# Patient Record
Sex: MALE | Race: WHITE | URBAN - METROPOLITAN AREA
[De-identification: names, ages, dates, MRNs, and addresses within clinical notes are randomized per-mention and may not be internally consistent; named-entity substitution may affect disease eponyms.]

---

## 2017-03-11 ENCOUNTER — OFFICE VISIT (OUTPATIENT)
Dept: URGENT CARE | Facility: URGENT CARE | Age: 22
End: 2017-03-11
Payer: COMMERCIAL

## 2017-03-11 VITALS
TEMPERATURE: 97.9 F | HEIGHT: 75 IN | DIASTOLIC BLOOD PRESSURE: 84 MMHG | OXYGEN SATURATION: 100 % | HEART RATE: 84 BPM | WEIGHT: 220 LBS | SYSTOLIC BLOOD PRESSURE: 132 MMHG | BODY MASS INDEX: 27.35 KG/M2

## 2017-03-11 DIAGNOSIS — R09.81 NASAL SINUS CONGESTION: ICD-10-CM

## 2017-03-11 DIAGNOSIS — H66.002 ACUTE SUPPURATIVE OTITIS MEDIA OF LEFT EAR WITHOUT SPONTANEOUS RUPTURE OF TYMPANIC MEMBRANE, RECURRENCE NOT SPECIFIED: Primary | ICD-10-CM

## 2017-03-11 DIAGNOSIS — R07.0 THROAT PAIN: ICD-10-CM

## 2017-03-11 DIAGNOSIS — J00 ACUTE NASOPHARYNGITIS: ICD-10-CM

## 2017-03-11 LAB
DEPRECATED S PYO AG THROAT QL EIA: NORMAL
MICRO REPORT STATUS: NORMAL
SPECIMEN SOURCE: NORMAL

## 2017-03-11 PROCEDURE — 87880 STREP A ASSAY W/OPTIC: CPT | Performed by: INTERNAL MEDICINE

## 2017-03-11 PROCEDURE — 99203 OFFICE O/P NEW LOW 30 MIN: CPT | Performed by: FAMILY MEDICINE

## 2017-03-11 PROCEDURE — 87081 CULTURE SCREEN ONLY: CPT | Performed by: INTERNAL MEDICINE

## 2017-03-11 RX ORDER — FLUTICASONE PROPIONATE 50 MCG
1-2 SPRAY, SUSPENSION (ML) NASAL DAILY
Qty: 1 BOTTLE | Refills: 11 | Status: SHIPPED | OUTPATIENT
Start: 2017-03-11 | End: 2019-04-24

## 2017-03-11 RX ORDER — LEVETIRACETAM 100 MG/ML
10 SOLUTION ORAL 2 TIMES DAILY
COMMUNITY
End: 2018-10-15

## 2017-03-11 RX ORDER — AMOXICILLIN 500 MG/1
500 CAPSULE ORAL 3 TIMES DAILY
Qty: 30 CAPSULE | Refills: 0 | Status: SHIPPED | OUTPATIENT
Start: 2017-03-11 | End: 2017-03-21

## 2017-03-11 NOTE — NURSING NOTE
"Chief Complaint   Patient presents with     Urgent Care     Sinus Problem     c/o sinus infection,HA and sore throat for 5 days       Initial /84 (BP Location: Left arm, Patient Position: Chair, Cuff Size: Adult Large)  Pulse 84  Temp 97.9  F (36.6  C) (Tympanic)  Ht 6' 3\" (1.905 m)  Wt 220 lb (99.8 kg)  SpO2 100%  BMI 27.5 kg/m2 Estimated body mass index is 27.5 kg/(m^2) as calculated from the following:    Height as of this encounter: 6' 3\" (1.905 m).    Weight as of this encounter: 220 lb (99.8 kg).  Medication Reconciliation: complete   Sruthi Simpson MA    "

## 2017-03-11 NOTE — MR AVS SNAPSHOT
"              After Visit Summary   3/11/2017    Crow Carnes    MRN: [id number]           Patient Information     Date Of Birth          1995        Visit Information        Provider Department      3/11/2017 8:30 AM Evie Lackey DO [facility]        Today's Diagnoses     Acute suppurative otitis media of left ear without spontaneous rupture of tympanic membrane, recurrence not specified    -  1    Throat pain        Acute nasopharyngitis        Nasal sinus congestion          Care Instructions            Follow-ups after your visit        Who to contact     If you have questions or need follow up information about today's clinic visit or your schedule please contact [facility] directly at [phone].  Normal or non-critical lab and imaging results will be communicated to you by [employer], letter or phone within 4 business days after the clinic has received the results. If you do not hear from us within 7 days, please contact the clinic through MyChart or phone. If you have a critical or abnormal lab result, we will notify you by phone as soon as possible.  Submit refill requests through [employer] or call your pharmacy and they will forward the refill request to us. Please allow 3 business days for your refill to be completed.          Additional Information About Your Visit        MyChart Information     [employer] lets you send messages to your doctor, view your test results, renew your prescriptions, schedule appointments and more. To sign up, go to [email or web address] . Click on \"Log in\" on the left side of the screen, which will take you to the Welcome page. Then click on \"Sign up Now\" on the right side of the page.     You will be asked to enter the access code listed below, as well as some personal information. Please follow the directions to create your username and password.     Your access code is: [email or web address]  Expires: 6/9/2017  9:14 AM   " "  Your access code will  in 90 days. If you need help or a new code, please call your [facility] clinic or [phone].        Care EveryWhere ID     This is your Care EveryWhere ID. This could be used by other organizations to access your [facility] medical records  [id number]        Your Vitals Were     Pulse Temperature Height Pulse Oximetry BMI (Body Mass Index)       84 97.9  F (36.6  C) (Tympanic) 6' 3\" (1.905 m) 100% 27.5 kg/m2        Blood Pressure from Last 3 Encounters:   17 132/84    Weight from Last 3 Encounters:   17 220 lb (99.8 kg)              We Performed the Following     Beta strep group A culture     Strep, Rapid Screen          Today's Medication Changes          These changes are accurate as of: 3/11/17  9:15 AM.  If you have any questions, ask your nurse or doctor.               Start taking these medicines.        Dose/Directions    amoxicillin 500 MG capsule   Commonly known as:  AMOXIL   Used for:  Acute suppurative otitis media of left ear without spontaneous rupture of tympanic membrane, recurrence not specified   Started by:  Evie Lackey DO        Dose:  500 mg   Take 1 capsule (500 mg) by mouth 3 times daily for 10 days   Quantity:  30 capsule   Refills:  0       fluticasone 50 MCG/ACT spray   Commonly known as:  FLONASE   Used for:  Nasal sinus congestion   Started by:  Evie Lackey DO        Dose:  1-2 spray   Spray 1-2 sprays into both nostrils daily   Quantity:  1 Bottle   Refills:  11            Where to get your medicines      These medications were sent to [facility] - [place], [place] - [address], [place] [address]     Phone:  [phone]     amoxicillin 500 MG capsule    fluticasone 50 MCG/ACT spray                Primary Care Provider    None Specified       No primary provider on file.        Thank you!     Thank you for choosing [facility]  for your care. Our goal is always " to provide you with excellent care. Hearing back from our patients is one way we can continue to improve our services. Please take a few minutes to complete the written survey that you may receive in the mail after your visit with us. Thank you!             Your Updated Medication List - Protect others around you: Learn how to safely use, store and throw away your medicines at www.disposemymeds.org.          This list is accurate as of: 3/11/17  9:15 AM.  Always use your most recent med list.                   Brand Name Dispense Instructions for use    amoxicillin 500 MG capsule    AMOXIL    30 capsule    Take 1 capsule (500 mg) by mouth 3 times daily for 10 days       fluticasone 50 MCG/ACT spray    FLONASE    1 Bottle    Spray 1-2 sprays into both nostrils daily       LAMOTRIGINE PO          levETIRAcetam 100 MG/ML solution    KEPPRA     Take 10 mg/kg by mouth 2 times daily       TOPIRAMATE PO

## 2017-03-11 NOTE — PROGRESS NOTES
"SUBJECTIVE:   Crow Carnes is a 21 year old male presenting with a chief complaint of Upper Respiratory/ENT symptoms:  Symptoms started 3/6 and  include: nasal and sinus congestion, rhinorrhea, \"cold symptoms\", cough , ear pressure/pain, sore throat and headache  Course of illness is: same.    Treatment measures tried:  OTC meds.  Predisposing factors include:  Non smoker.  h/o exercise induced asthma as a child, no problems since middle school     ROS:  5-Point Review of Systems Negative-- Except as stated above.    OBJECTIVE  /84 (BP Location: Left arm, Patient Position: Chair, Cuff Size: Adult Large)  Pulse 84  Temp 97.9  F (36.6  C) (Tympanic)  Ht 6' 3\" (1.905 m)  Wt 220 lb (99.8 kg)  SpO2 100%  BMI 27.5 kg/m2  GENERAL:  Awake, alert and interactive. No acute distress.  HEENT:   NC/AT, EOMI, clear conjunctiva.  Nose congested.  Oropharynx with mild erythema, moist and clear.  TM right not visible due to wax impaction, TM left brightly erythematous, excess wax in canal.  NECK: supple and free of adenopathy  CHEST:  Lungs are clear, no rhonchi, wheezing or rales. Normal symmetric air entry throughout both lung fields.   HEART:  S1 and S2 normal, no murmurs, clicks, gallops or rubs. Regular rate and rhythm.    Results for orders placed or performed in visit on 03/11/17   Strep, Rapid Screen   Result Value Ref Range    Specimen Description Throat     Rapid Strep A Screen       NEGATIVE: No Group A streptococcal antigen detected by immunoassay, await   culture report.      Micro Report Status FINAL 03/11/2017        ASSESSMENT/PLAN    ICD-10-CM    1. Acute suppurative otitis media of left ear without spontaneous rupture of tympanic membrane, recurrence not specified H66.002 amoxicillin (AMOXIL) 500 MG capsule   2. Throat pain R07.0 Strep, Rapid Screen     Beta strep group A culture   3. Acute nasopharyngitis J00    4. Nasal sinus congestion R09.81 fluticasone (FLONASE) 50 MCG/ACT spray    We discussed the " expected course, medications and symptomatic cares in detail, including return to care if symptoms not improving as expected, do not resolve completely, or if any new or worsening symptoms develop.

## 2017-03-13 LAB
BACTERIA SPEC CULT: NORMAL
MICRO REPORT STATUS: NORMAL
SPECIMEN SOURCE: NORMAL

## 2017-05-26 ENCOUNTER — TRANSFERRED RECORDS (OUTPATIENT)
Dept: HEALTH INFORMATION MANAGEMENT | Facility: CLINIC | Age: 22
End: 2017-05-26

## 2018-04-30 ENCOUNTER — TELEPHONE (OUTPATIENT)
Dept: OPHTHALMOLOGY | Facility: CLINIC | Age: 23
End: 2018-04-30

## 2018-10-12 ENCOUNTER — PRE VISIT (OUTPATIENT)
Dept: NEUROLOGY | Facility: CLINIC | Age: 23
End: 2018-10-12

## 2018-10-12 NOTE — TELEPHONE ENCOUNTER
FUTURE VISIT INFORMATION      FUTURE VISIT INFORMATION:    Date: 10/15/18    Time: 1p    Location: [facility]  REFERRAL INFORMATION:    Referring provider:  Dr. Carla Martinez    Referring providers clinic:  [facility]    Reason for visit/diagnosis  Unknown    RECORDS REQUESTED FROM:       Clinic name Comments Records Status Imaging Status   [facility]  Requested Requested                                   RECORDS STATUS      10/15/18: No known reason for visit. Called referring provider's office and left a voice mail to fax over records/imaging.

## 2018-10-15 ENCOUNTER — OFFICE VISIT (OUTPATIENT)
Dept: NEUROLOGY | Facility: CLINIC | Age: 23
End: 2018-10-15
Payer: COMMERCIAL

## 2018-10-15 ENCOUNTER — APPOINTMENT (OUTPATIENT)
Dept: LAB | Facility: CLINIC | Age: 23
End: 2018-10-15
Payer: COMMERCIAL

## 2018-10-15 VITALS
SYSTOLIC BLOOD PRESSURE: 117 MMHG | BODY MASS INDEX: 24 KG/M2 | HEART RATE: 90 BPM | HEIGHT: 72 IN | DIASTOLIC BLOOD PRESSURE: 65 MMHG | TEMPERATURE: 97.9 F | OXYGEN SATURATION: 100 % | WEIGHT: 177.2 LBS

## 2018-10-15 DIAGNOSIS — G40.919 INTRACTABLE EPILEPSY WITHOUT STATUS EPILEPTICUS, UNSPECIFIED EPILEPSY TYPE (H): ICD-10-CM

## 2018-10-15 DIAGNOSIS — G44.89 OTHER HEADACHE SYNDROME: ICD-10-CM

## 2018-10-15 DIAGNOSIS — R56.9 SEIZURE-LIKE ACTIVITY (H): Primary | ICD-10-CM

## 2018-10-15 LAB
ALBUMIN SERPL-MCNC: 4.6 G/DL (ref 3.4–5)
ALP SERPL-CCNC: 56 U/L (ref 40–150)
ALT SERPL W P-5'-P-CCNC: 24 U/L (ref 0–70)
ANION GAP SERPL CALCULATED.3IONS-SCNC: 5 MMOL/L (ref 3–14)
AST SERPL W P-5'-P-CCNC: 14 U/L (ref 0–45)
BASOPHILS # BLD AUTO: 0 10E9/L (ref 0–0.2)
BASOPHILS NFR BLD AUTO: 0.8 %
BILIRUB SERPL-MCNC: 0.5 MG/DL (ref 0.2–1.3)
BUN SERPL-MCNC: 20 MG/DL (ref 7–30)
CALCIUM SERPL-MCNC: 9.3 MG/DL (ref 8.5–10.1)
CHLORIDE SERPL-SCNC: 106 MMOL/L (ref 94–109)
CO2 SERPL-SCNC: 28 MMOL/L (ref 20–32)
CREAT SERPL-MCNC: 1.36 MG/DL (ref 0.66–1.25)
DIFFERENTIAL METHOD BLD: NORMAL
EOSINOPHIL # BLD AUTO: 0.2 10E9/L (ref 0–0.7)
EOSINOPHIL NFR BLD AUTO: 3.2 %
ERYTHROCYTE [DISTWIDTH] IN BLOOD BY AUTOMATED COUNT: 11.8 % (ref 10–15)
GFR SERPL CREATININE-BSD FRML MDRD: 64 ML/MIN/1.7M2
GLUCOSE SERPL-MCNC: 91 MG/DL (ref 70–99)
HCT VFR BLD AUTO: 52.3 % (ref 40–53)
HGB BLD-MCNC: 17.5 G/DL (ref 13.3–17.7)
IMM GRANULOCYTES # BLD: 0 10E9/L (ref 0–0.4)
IMM GRANULOCYTES NFR BLD: 0.4 %
LYMPHOCYTES # BLD AUTO: 1.7 10E9/L (ref 0.8–5.3)
LYMPHOCYTES NFR BLD AUTO: 34.3 %
MCH RBC QN AUTO: 29.7 PG (ref 26.5–33)
MCHC RBC AUTO-ENTMCNC: 33.5 G/DL (ref 31.5–36.5)
MCV RBC AUTO: 89 FL (ref 78–100)
MONOCYTES # BLD AUTO: 0.5 10E9/L (ref 0–1.3)
MONOCYTES NFR BLD AUTO: 9.6 %
NEUTROPHILS # BLD AUTO: 2.6 10E9/L (ref 1.6–8.3)
NEUTROPHILS NFR BLD AUTO: 51.7 %
NRBC # BLD AUTO: 0 10*3/UL
NRBC BLD AUTO-RTO: 0 /100
PLATELET # BLD AUTO: 253 10E9/L (ref 150–450)
POTASSIUM SERPL-SCNC: 4.5 MMOL/L (ref 3.4–5.3)
PROT SERPL-MCNC: 7.8 G/DL (ref 6.8–8.8)
RBC # BLD AUTO: 5.9 10E12/L (ref 4.4–5.9)
SODIUM SERPL-SCNC: 138 MMOL/L (ref 133–144)
WBC # BLD AUTO: 5 10E9/L (ref 4–11)

## 2018-10-15 RX ORDER — AMITRIPTYLINE HYDROCHLORIDE 10 MG/1
10 TABLET ORAL AT BEDTIME
Refills: 4 | COMMUNITY
Start: 2017-11-01 | End: 2018-10-15

## 2018-10-15 RX ORDER — AMITRIPTYLINE HYDROCHLORIDE 10 MG/1
TABLET ORAL
Qty: 60 TABLET | Refills: 6 | Status: SHIPPED | OUTPATIENT
Start: 2018-10-15 | End: 2019-01-28

## 2018-10-15 RX ORDER — TOPIRAMATE 25 MG/1
TABLET, FILM COATED ORAL
Qty: 120 TABLET | Refills: 6 | Status: SHIPPED | OUTPATIENT
Start: 2018-10-15 | End: 2018-10-17

## 2018-10-15 RX ORDER — LAMOTRIGINE 100 MG/1
TABLET ORAL
Qty: 150 TABLET | Refills: 6 | Status: SHIPPED | OUTPATIENT
Start: 2018-10-15 | End: 2019-01-28

## 2018-10-15 ASSESSMENT — ENCOUNTER SYMPTOMS
ALTERED TEMPERATURE REGULATION: 0
INSOMNIA: 1
DISTURBANCES IN COORDINATION: 0
POLYPHAGIA: 0
FEVER: 0
TREMORS: 0
SEIZURES: 1
DEPRESSION: 1
NUMBNESS: 0
WEAKNESS: 0
WEIGHT LOSS: 0
DIZZINESS: 0
DOUBLE VISION: 0
DECREASED CONCENTRATION: 1
NIGHT SWEATS: 0
HALLUCINATIONS: 0
WEIGHT GAIN: 0
FATIGUE: 1
CHILLS: 0
DECREASED APPETITE: 0
EYE IRRITATION: 0
EYE REDNESS: 0
EYE WATERING: 0
MEMORY LOSS: 0
PANIC: 0
INCREASED ENERGY: 1
POLYDIPSIA: 0
EYE PAIN: 1
SPEECH CHANGE: 0
HEADACHES: 1
NERVOUS/ANXIOUS: 0
TINGLING: 0
LOSS OF CONSCIOUSNESS: 0
PARALYSIS: 0

## 2018-10-15 ASSESSMENT — PAIN SCALES - GENERAL: PAINLEVEL: NO PAIN (0)

## 2018-10-15 NOTE — NURSING NOTE
Chief Complaint   Patient presents with     New Patient     [facility] NEW PATIENT CONSULTATION VISIT RELATED TO SEIZURES        Betty Grossman MA

## 2018-10-15 NOTE — LETTER
10/15/2018       RE: Crow Carnes  [address] [facility] [address]     Dear Colleague,    Thank you for referring your patient, Crow Carnes, to the [facility]UR[facility] at [facility]. Please see a copy of my visit note below.    Service Date: 10/15/2018      Carla Martinez MD   [facility],     [address] [place] [address]      RE: Crow Carnes   MRN: [id number]   : 1995      Dear Dr. Martinez:       Thank you for referring Mr. Crow Carnes to [facility] for transfer of care.  He recently moved to [place] and would like a center closer to his home.  He is a 22-year-old, right-handed male who states that he first started having seizures as an infant.  His mom has told him that he had a traumatic delivery and subsequently had seizures.  The patient denies having intracranial hemorrhage in infancy, but is not aware of the full details and states that he had seizures from infancy to 1 years of age and took a medication and does not know the name of the medication.  The seizures subsided from age 1 up until the age of 16-15, during which time he was medication free.  At the age of 16-17, he began to have recurrent spells, which are described below in detail, but these spells consist of dizziness and an internal sensation of spinning.  No loss of awareness.  These happen 10-20 times per month.  In the past, he has been diagnosed with seizures and put on lamotrigine and topiramate, which he thinks have been helpful.  In the last 4 months, he feels these spells have worsened because of his job hours.  He is working a swing schedule, which means that he works various shifts.  Sometimes he may work from 6:00 a.m. to 2:00 p.m., and other times he may work from 6:00 p.m. to 2:00 a.m.  The patient denies ever having status epilepticus.  No convulsions and no partial seizures with loss of awareness.  Currently, on seizure medications, the  patient has insomnia, difficulty with word retrieval and cognitive deficits.      Spell type 1:  The patient has a dizzy sensation.  He feels like he has an internal spinning sensation.  No loss of awareness.  These happen 10-20 times a month, lasting 10-20 seconds.  They irritate him, but they do not cause trauma.  He may have increased headache after these spells.  When his mom has witnessed them, she says that he is not paying attention.  No history of complex partial seizures, no generalized tonic-clonic seizures, no loss of awareness and no loss of motor control.      RISK FACTORS FOR EPILEPSY:  The patient had seizures as an infant and had a traumatic delivery; he does not know the details.  He took seizure medications from infancy to 1 years of age.  He states that there is no family history of epilepsy.  No history of encephalitis, meningitis or stroke.  He had no major developmental delays in walking or language.  He had an IEP early on, but after middle school, he no longer needed an IEP and did take advanced classes and completed higher education.        PAST MEDICAL HISTORY:  Migraines, depression and epilepsy.      PAST SURGICAL HISTORY:  None.      PAST SEIZURE MEDICATIONS:  Levetiracetam was not helpful, he states, and he is not aware of all his seizure meds.      ALLERGIES:  None.      SOCIAL HISTORY:  The patient lives with a roommate.  He works as a  in the lab.  He does not work with machines, but he does work with chemicals at [employer].  He has completed a bachelor's degree in chemistry.  He is at a new job and is just adjusting to their schedule.  Their hours are variable.  He has swing hours and may work 6:00 a.m. to 2:00 p.m., and other times he may work 6:00 p.m. to 2:00 a.m., so he does forget to take his medications sometimes.  The patient drinks 4 drinks on the weekend.  No smoking, no drugs.      PSYCHOLOGICAL HISTORY:  The patient denies feeling depressed currently.   No suicidal ideations.  He did have an episode of severe depression a year ago, during which time he lost 70 pounds.      EXAMINATION /65  Pulse 90  Temp 97.9  F (36.6  C) (Oral)  Ht 6' (182.9 cm)  Wt 177 lb 3.2 oz (80.4 kg)  SpO2 100%  BMI 24.03 kg/m2  GENERAL:  Alert and oriented x3.  Cognitively, the patient is alert and oriented; however, he is slow to respond.  Has a flat affect.     CARDIOVASCULAR:  Regular rate and rhythm, positive S1, S2.   LUNGS:  Clear to auscultation bilaterally.   ABDOMEN:  Nondistended, nontender.  Normal active bowel sounds.      NEUROLOGICAL EXAMINATION   Mental Status and Higher Cortical Functions:  Alert and oriented to person, place, and time.  Speech fluent, with intact naming and repetition. No dysarthria.  Cranial Nerves (II-XII):  Pupils equal, round, and reactive to light.  Extraocular movements full with no nystagmus.  Visual fields full to confrontation.  Facial sensation intact to light touch, temperature, and pin prick.  Face symmetric at rest and with activation.  Hearing intact to finger rub bilaterally.  Tongue midline and palate elevation symmetric.  Sternocleidomastoid and trapezius 5/5 bilaterally.   Fundoscopic examination was unremarkable for pallor or edema bilaterally.    Motor:  Normal tone, normal bulk, and no pronator drift.  No tremors or fasciculations. Arm/hand circumduction was symmetric.  Motor strength 5/5 in upper and lower extremities.   Sensation:  Intact to light touch, vibration, and temperature.    Coordination:  Normal finger-nose-finger, fine finger movements, and rapid alternating movements.  No ataxia or dysmetria.     Reflexes:  Deep tendon reflexes 2+ and symmetric throughout.    Gait:  Casual gait and stance normal.         IMPRESSION:  A 22-year-old, right-handed male presents with recurrent paroxysmal spells consisting of dizziness and an internal spinning sensation, which in the past have been identified to be seizures on EEG  data.  The patient states his MRI of the brain is normal.  We do not have records from [facility], and we will have to obtain them.  He had an EEG at [facility] on 10/15/2018, which was notable for bursts of intermittent generalized delta-theta slowing consistent with a mild encephalopathy.  Note the patient had one aura in which he felt dizziness, and there were no associated EEG changes.  The EEG was consistent with a mild encephalopathy, and certainly simple partial seizures may be scalp EEG negative.      DISCUSSION:  The patient is currently on topiramate and lamotrigine and has side effects of insomnia and cognitive deficits.  It would be helpful to shift the majority of his lamotrigine into the morning time, 300 mg in the morning and 200 mg at 6:00 a.m., because the patient usually goes to bed at 10:00 p.m.  Additionally, I would like to lower topiramate dose as the patient works as a , and he does have word-retrieval difficulties and cognitive deficits.  We will increase his amitriptyline in an effort to better control headaches.  Consideration may be given to Depakote in the future.  I did ask the patient today if he wanted to do video EEG monitoring for us to characterize his spells better and to make sure he does not have partial seizures with loss of awareness; he declined, as this would not be possible for him.  In the future, we may give consideration to ambulatory EEG.      PLAN:   1.  Reduce topiramate and change administration time of lamotrigine.  Increase amitriptyline.                    Medication Name   Tablet Size         8 AM  (morning)   6 PM (Night)   Notes    Week 1 Lamotrigine 100 mg   3 tablet   2 tablet   Take lamotrigine after food     Topiramate 50 mg   2 tablet    2 tablet       Amitriptyline 10 mg      1 tablet                       Medication Name   Tablet Size         8 AM  (morning)   6 PM (Night)   Notes    Week 2 Lamotrigine 100 mg   3 tablet   2 tablet        Topiramate 50 mg   2 tablet    2 tablet       Amitriptyline 10 mg      2 tablet  Increase dose                     Medication Name   Tablet Size         8 AM  (morning)   6 PM (Night)   Notes    Week 3 Lamotrigine 100 mg   3 tablet   2 tablet       Topiramate 50 mg   1 tablet    2 tablet  Decrease dose     Amitriptyline 10 mg      2 tablet                  Medication Name   Tablet Size         8 AM  (morning)   6 PM (Night)   Notes    Week 4 Lamotrigine 100 mg   3 tablet   2 tablet       Topiramate 50 mg   1 tablet    1 tablet  Decrease dose     Amitriptyline 10 mg      2 tablet         If your seizure gets worse or headaches increase, then go back to the prior week or call [facility]      2.  I typed a letter out for work to reduce his swing hours in an effort to stabilize his schedule and sleep schedule.  This may hopefully reduce his seizures.   3.  Check AED levels for efficacy, toxicity and side effects.   4.  Consider ambulatory EEG in the future to characterize interictal/ictal burden, as the patient declined inpatient hospitalization.     5.  Future medications that may be considered are Trileptal, Vimpat, Zonegran and gabapentin, also.  Follow up in 6 weeks.   6.  Nurse call in 3 weeks to check on AED medication changes.      Thank you for allowing us to participate in Mr. Carnes' care.      Sincerely,      Carmel Mcgregor MD         I spent 60 minutes with the patient. During this time counseling and coordination of care exceeded 50% of the face to face visit time. I addressed all questions the patient/caregiver raised in regards to the patient's medical care.

## 2018-10-15 NOTE — PATIENT INSTRUCTIONS
Medication Name   Tablet Size         8 AM  (morning)   6 PM (Night)   Notes    Week 1 Lamotrigine 100 mg   3 tablet   2 tablet   Take lamotrigine after food     Topiramate 50 mg   2 tablet    2 tablet       Amitriptyline 10 mg      1 tablet                       Medication Name   Tablet Size         8 AM  (morning)   6 PM (Night)   Notes    Week 2 Lamotrigine 100 mg   3 tablet   2 tablet       Topiramate 50 mg   2 tablet    2 tablet       Amitriptyline 10 mg      2 tablet  Increase dose                     Medication Name   Tablet Size         8 AM  (morning)   6 PM (Night)   Notes    Week 3 Lamotrigine 100 mg   3 tablet   2 tablet       Topiramate 50 mg   1 tablet    2 tablet  Decrease dose     Amitriptyline 10 mg      2 tablet                  Medication Name   Tablet Size         8 AM  (morning)   6 PM (Night)   Notes    Week 4 Lamotrigine 100 mg   3 tablet   2 tablet       Topiramate 50 mg   1 tablet    1 tablet  Decrease dose     Amitriptyline 10 mg      2 tablet         If your seizure gets worse or headaches increase, then go back to the prior week or [facility]    CONTINUE TAKING YOUR OTHER MEDICATIONS AS PREVIOUSLY DIRECTED.  IF YOU  HAVE ANY SIDE EFFECTS OR CONCERNS ABOUT YOUR ANTIEPILEPTIC DRUG CALL [facility] OFF[facility] -[phone]. PLEASE FOLLOW MEDICATION CHANGES AS ADVISED.     This titration schedule was revive wed with patient or caregiver. They expressed understanding of these antiepileptic drug changes.     MICKY SALAZAR MD

## 2018-10-15 NOTE — LETTER
10/15/2018         RE: Crow Carnes  : 1995   MRN: [id number]      To Whom It May Concern,       I recommend that Mr. Carnes work regular scheduled hours, to accomodate his medical condition. Swing shifts exacerbate his medical condition and have an adverse effect on his health. Thank you for your cooperation.       Sincerely,       Carmel Mcgregor MD

## 2018-10-15 NOTE — LETTER
Patient:  Crow Carnes  :   1995  MRN:     [id number]        Mr.Casey Carnes  [address] [facility] [address]        November 15, 2018    Dear ,    We are writing to inform you of your test results.    Your test results fall within the expected range(s) or remain unchanged from previous results.  Please continue with current treatment plan.    Resulted Orders   Lamotrigine Level   Result Value Ref Range    Lamotrigine Level 7.6 2.5 - 15.0 ug/mL      Comment:      (Note)  INTERPRETIVE INFORMATION:  Lamotrigine  Therapeutic Range:  2.5-15.0 ug/mL             Toxic:  Not well established  Pharmacokinetics varies widely, particularly with   co-medications and/or compromised renal function.  Adverse   effects may include dizziness, somnolence, nausea and   vomiting.  Performed by [facility],  [address] [facility] [address] [phone]  www.[email or web address], [place] MD, Lab. Director     Topiramate Level   Result Value Ref Range    Topiramate Level 3.3 (L) 5.0 - 20.0 ug/mL      Comment:      (Note)  INTERPRETIVE INFORMATION: Topiramate  Therapeutic range:  5.0-20.0 ug/mL             Toxic:  Not well established  Pharmacokinetics varies widely, particularly with   co-medications, age, and/or compromised renal function.   Adverse effects may include somnolence, fatigue, and   dizziness.  Performed by [facility],  [address] [facility] [id number] [phone]  www.[facility], [place] MD, Lab. Director     Comprehensive metabolic panel   Result Value Ref Range    Sodium 138 133 - 144 mmol/L    Potassium 4.5 3.4 - 5.3 mmol/L    Chloride 106 94 - 109 mmol/L    Carbon Dioxide 28 20 - 32 mmol/L    Anion Gap 5 3 - 14 mmol/L    Glucose 91 70 - 99 mg/dL    Urea Nitrogen 20 7 - 30 mg/dL    Creatinine 1.36 (H) 0.66 - 1.25 mg/dL    GFR Estimate 64 >60 mL/min/1.7m2      Comment:      Non  GFR Calc    GFR Estimate If Black 77 >60 mL/min/1.7m2      Comment:        GFR Calc    Calcium 9.3 8.5 - 10.1 mg/dL    Bilirubin Total 0.5 0.2 - 1.3 mg/dL    Albumin 4.6 3.4 - 5.0 g/dL    Protein Total 7.8 6.8 - 8.8 g/dL    Alkaline Phosphatase 56 40 - 150 U/L    ALT 24 0 - 70 U/L    AST 14 0 - 45 U/L   CBC with platelets differential   Result Value Ref Range    WBC 5.0 4.0 - 11.0 10e9/L    RBC Count 5.90 4.4 - 5.9 10e12/L    Hemoglobin 17.5 13.3 - 17.7 g/dL    Hematocrit 52.3 40.0 - 53.0 %    MCV 89 78 - 100 fl    MCH 29.7 26.5 - 33.0 pg    MCHC 33.5 31.5 - 36.5 g/dL    RDW 11.8 10.0 - 15.0 %    Platelet Count 253 150 - 450 10e9/L    Diff Method Automated Method     % Neutrophils 51.7 %    % Lymphocytes 34.3 %    % Monocytes 9.6 %    % Eosinophils 3.2 %    % Basophils 0.8 %    % Immature Granulocytes 0.4 %    Nucleated RBCs 0 0 /100    Absolute Neutrophil 2.6 1.6 - 8.3 10e9/L    Absolute Lymphocytes 1.7 0.8 - 5.3 10e9/L    Absolute Monocytes 0.5 0.0 - 1.3 10e9/L    Absolute Eosinophils 0.2 0.0 - 0.7 10e9/L    Absolute Basophils 0.0 0.0 - 0.2 10e9/L    Abs Immature Granulocytes 0.0 0 - 0.4 10e9/L    Absolute Nucleated RBC 0.0          Happy HolarielsCarmel MD              [phone]  1995

## 2018-10-15 NOTE — MR AVS SNAPSHOT
After Visit Summary   10/15/2018    Crow Carnes    MRN: [id number]           Patient Information     Date Of Birth          1995        Visit Information        Provider Department      10/15/2018 1:00 PM Carmel Salazar MD [facility] Neurology        Today's Diagnoses     Intractable epilepsy without status epilepticus, unspecified epilepsy type (H)        Other headache syndrome          Care Instructions                     Medication Name   Tablet Size         8 AM  (morning)   6 PM (Night)   Notes    Week 1 Lamotrigine 100 mg   3 tablet   2 tablet   Take lamotrigine after food     Topiramate 50 mg   2 tablet    2 tablet       Amitriptyline 10 mg      1 tablet                       Medication Name   Tablet Size         8 AM  (morning)   6 PM (Night)   Notes    Week 2 Lamotrigine 100 mg   3 tablet   2 tablet       Topiramate 50 mg   2 tablet    2 tablet       Amitriptyline 10 mg      2 tablet  Increase dose                     Medication Name   Tablet Size         8 AM  (morning)   6 PM (Night)   Notes    Week 3 Lamotrigine 100 mg   3 tablet   2 tablet       Topiramate 50 mg   1 tablet    2 tablet  Decrease dose     Amitriptyline 10 mg      2 tablet                  Medication Name   Tablet Size         8 AM  (morning)   6 PM (Night)   Notes    Week 4 Lamotrigine 100 mg   3 tablet   2 tablet       Topiramate 50 mg   1 tablet    1 tablet  Decrease dose     Amitriptyline 10 mg      2 tablet         If your seizure gets worse or headaches increase, then go back to the prior week or [facility]    CONTINUE TAKING YOUR OTHER MEDICATIONS AS PREVIOUSLY DIRECTED.  IF YOU  HAVE ANY SIDE EFFECTS OR CONCERNS ABOUT YOUR ANTIEPILEPTIC DRUG CALL [facility] [phone]. PLEASE FOLLOW MEDICATION CHANGES AS ADVISED.     This titration schedule was revive wed with patient or caregiver. They expressed understanding of these antiepileptic drug changes.     CARMEL SALAZAR MD               Follow-ups after  your visit        Follow-up notes from your care team     Return in about 6 weeks (around 2018).      Your next 10 appointments already scheduled     Dec 10, 2018  2:00 PM CST   (Arrive by 1:45 PM)   Return Seizure with Carmel Mcgregor MD   [facility] ([facility] [place])    [address] [address] [phone]              Who to contact     Please call your clinic at [phone] to:    Ask questions about your health    Make or cancel appointments    Discuss your medicines    Learn about your test results    Speak to your doctor            Additional Information About Your Visit        MyChart Information     [employer] is an electronic gateway that provides easy, online access to your medical records. With [employer], you can request a clinic appointment, read your test results, renew a prescription or communicate with your care team.     To sign up for [employer] visit the website at [email or web address]   You will be asked to enter the access code listed below, as well as some personal information. Please follow the directions to create your username and password.     Your access code is: [email or web address]  Expires: 2018  2:50 PM     Your access code will  in 90 days. If you need help or a new code, please contact your [facility] or call [phone] for assistance.        Care EveryWhere ID     This is your Care EveryWhere ID. This could be used by other organizations to access your [place] medical records  [id number]        Your Vitals Were     Pulse Temperature Height Pulse Oximetry BMI (Body Mass Index)       90 97.9  F (36.6  C) (Oral) 1.829 m (6') 100% 24.03 kg/m2        Blood Pressure from Last 3 Encounters:   10/15/18 117/65   17 132/84    Weight from Last 3 Encounters:   10/15/18 80.4 kg (177 lb 3.2 oz)   17 99.8 kg (220 lb)              We Performed the Following     CBC with platelets  differential     Comprehensive metabolic panel     Lamotrigine Level     Topiramate Level          Today's Medication Changes          These changes are accurate as of 10/15/18  1:55 PM.  If you have any questions, ask your nurse or doctor.               These medicines have changed or have updated prescriptions.        Dose/Directions    amitriptyline 10 MG tablet   Commonly known as:  ELAVIL   This may have changed:    - how much to take  - when to take this  - additional instructions   Used for:  Other headache syndrome   Changed by:  Carmel Mcgregor MD        Increase as instructed 20 mg at night   Quantity:  60 tablet   Refills:  6       lamoTRIgine 100 MG tablet   Commonly known as:  LaMICtal   This may have changed:    - medication strength  - how much to take  - how to take this  - when to take this  - additional instructions   Used for:  Intractable epilepsy without status epilepticus, unspecified epilepsy type (H)   Changed by:  Carmel Mcgregor MD        300 mg am and 200 mg pm   Quantity:  150 tablet   Refills:  6       topiramate 25 MG tablet   Commonly known as:  TOPAMAX   This may have changed:    - medication strength  - how much to take  - how to take this  - when to take this  - additional instructions   Used for:  Intractable epilepsy without status epilepticus, unspecified epilepsy type (H), Other headache syndrome   Changed by:  Carmel Mcgregor MD        100 mg twice a day   Quantity:  120 tablet   Refills:  6         Stop taking these medicines if you haven't already. Please contact your care team if you have questions.     levETIRAcetam 100 MG/ML solution   Commonly known as:  KEPPRA   Stopped by:  Carmel Mcgregor MD                Where to get your medicines      These medications were sent to [facility] [address]  [address] [facility] [address]     Phone:  [phone]     amitriptyline 10 MG tablet    lamoTRIgine 100 MG  tablet    topiramate 25 MG tablet                Primary Care Provider Office Phone # Fax #    Reji BOURGEOIS Reid [phone]       [address] [address]        Equal Access to Services     MARIA EUGENIA SEGOVIA : Hadii eliza cadena hadxavio Soomaali, waaxda luqadaha, qaybta kaalmada adeegyada, jyotsna boon thien boswlel laMiguelnikolas davis. So [facility] [phone].    ATENCIÓN: Si habla español, tiene a perez disposición servicios gratuitos de asistencia lingüística. [facility] [phone].    We comply with applicable federal civil rights laws and [place] laws. We do not discriminate on the basis of race, color, national origin, age, disability, sex, sexual orientation, or gender identity.            Thank you!     Thank you for choosing [facility]  for your care. Our goal is always to provide you with excellent care. Hearing back from our patients is one way we can continue to improve our services. Please take a few minutes to complete the written survey that you may receive in the mail after your visit with us. Thank you!             Your Updated Medication List - Protect others around you: Learn how to safely use, store and throw away your medicines at www.disposemymeds.org.          This list is accurate as of 10/15/18  1:55 PM.  Always use your most recent med list.                   Brand Name Dispense Instructions for use Diagnosis    amitriptyline 10 MG tablet    ELAVIL    60 tablet    Increase as instructed 20 mg at night    Other headache syndrome       fluticasone 50 MCG/ACT spray    FLONASE    1 Bottle    Spray 1-2 sprays into both nostrils daily    Nasal sinus congestion       lamoTRIgine 100 MG tablet    LaMICtal    150 tablet    300 mg am and 200 mg pm    Intractable epilepsy without status epilepticus, unspecified epilepsy type (H)       topiramate 25 MG tablet    TOPAMAX    120 tablet    100 mg twice a day    Intractable epilepsy without status epilepticus,  unspecified epilepsy type (H), Other headache syndrome

## 2018-10-15 NOTE — MR AVS SNAPSHOT
After Visit Summary   10/15/2018    Crow Carnes    MRN: [id number]           Patient Information     Date Of Birth          1995        Visit Information        Provider Department      10/15/2018 8:30 AM  EEG TECH 2 [facility] OUT[facility]IENT        Today's Diagnoses     Seizure-like activity (H)    -  1       Follow-ups after your visit        Your next 10 appointments already scheduled     Dec 10, 2018  2:00 PM CST   (Arrive by 1:45 PM)   Return Seizure with Carmel Mcgregor MD   [facility] ([facility] [place])    [address] [place] [address] [phone]              Who to contact     Please call your clinic at [phone] to:    Ask questions about your health    Make or cancel appointments    Discuss your medicines    Learn about your test results    Speak to your doctor            Additional Information About Your Visit        MyChart Information     [employer] is an electronic gateway that provides easy, online access to your medical records. With [employer], you can request a clinic appointment, read your test results, renew a prescription or communicate with your care team.     To sign up for [employer] visit the website at [email or web address]   You will be asked to enter the access code listed below, as well as some personal information. Please follow the directions to create your username and password.     Your access code is: [email or web address]  Expires: 2018  2:50 PM     Your access code will  in 90 days. If you need help or a new code, please contact your [facility] or call [phone] for assistance.        Care EveryWhere ID     This is your Care EveryWhere ID. This could be used by other organizations to access your [place] medical records  [id number]         Blood Pressure from Last 3 Encounters:   No data found for BP    Weight from Last 3 Encounters:   No data found for Wt               Today, you had the following     No orders found for display         Today's Medication Changes          These changes are accurate as of 10/15/18 11:59 PM.  If you have any questions, ask your nurse or doctor.               These medicines have changed or have updated prescriptions.        Dose/Directions    amitriptyline 10 MG tablet   Commonly known as:  ELAVIL   This may have changed:    - how much to take  - when to take this  - additional instructions   Used for:  Other headache syndrome   Changed by:  Carmel Mcgregor MD        Increase as instructed 20 mg at night   Quantity:  60 tablet   Refills:  6       lamoTRIgine 100 MG tablet   Commonly known as:  LaMICtal   This may have changed:    - medication strength  - how much to take  - how to take this  - when to take this  - additional instructions   Used for:  Intractable epilepsy without status epilepticus, unspecified epilepsy type (H)   Changed by:  Carmel Mcgregor MD        300 mg am and 200 mg pm   Quantity:  150 tablet   Refills:  6       topiramate 25 MG tablet   Commonly known as:  TOPAMAX   This may have changed:    - medication strength  - how much to take  - how to take this  - when to take this  - additional instructions   Used for:  Intractable epilepsy without status epilepticus, unspecified epilepsy type (H), Other headache syndrome   Changed by:  Carmel Mcgregor MD        100 mg twice a day   Quantity:  120 tablet   Refills:  6         Stop taking these medicines if you haven't already. Please contact your care team if you have questions.     levETIRAcetam 100 MG/ML solution   Commonly known as:  KEPPRA   Stopped by:  Carmel Mcgregor MD                Where to get your medicines      These medications were sent to [facility] [address]  [address] [address]     Phone:  [phone]     amitriptyline 10 MG tablet    lamoTRIgine 100 MG tablet    topiramate 25 MG  tablet                Primary Care Provider Office Phone # Fax #    Reji Mathews [phone]       [facility] [place] [address] [facility] [address]        Equal Access to Services     MARIA EUGENIA SEGOVIA : Hadcata eliza ku luio Soomaali, waaxda luqadaha, qaybta kaalmada adeegyada, jyotsna boon thien boswell laMiguelnikolas davis. So [facility] [phone].    ATENCIÓN: Si habla español, tiene a perez disposición servicios gratuitos de asistencia lingüística. [facility] [phone].    We comply with applicable federal civil rights laws and [place] laws. We do not discriminate on the basis of race, color, national origin, age, disability, sex, sexual orientation, or gender identity.            Thank you!     Thank you for choosing [facility] OUT[facility]  for your care. Our goal is always to provide you with excellent care. Hearing back from our patients is one way we can continue to improve our services. Please take a few minutes to complete the written survey that you may receive in the mail after your visit with us. Thank you!             Your Updated Medication List - Protect others around you: Learn how to safely use, store and throw away your medicines at www.disposemymeds.org.          This list is accurate as of 10/15/18 11:59 PM.  Always use your most recent med list.                   Brand Name Dispense Instructions for use Diagnosis    amitriptyline 10 MG tablet    ELAVIL    60 tablet    Increase as instructed 20 mg at night    Other headache syndrome       fluticasone 50 MCG/ACT spray    FLONASE    1 Bottle    Spray 1-2 sprays into both nostrils daily    Nasal sinus congestion       lamoTRIgine 100 MG tablet    LaMICtal    150 tablet    300 mg am and 200 mg pm    Intractable epilepsy without status epilepticus, unspecified epilepsy type (H)       topiramate 25 MG tablet    TOPAMAX    120 tablet    100 mg twice a day    Intractable epilepsy without status epilepticus, unspecified epilepsy type (H),  Other headache syndrome

## 2018-10-16 ENCOUNTER — TELEPHONE (OUTPATIENT)
Dept: NEUROLOGY | Facility: CLINIC | Age: 23
End: 2018-10-16

## 2018-10-16 DIAGNOSIS — G44.89 OTHER HEADACHE SYNDROME: ICD-10-CM

## 2018-10-16 DIAGNOSIS — G40.919 INTRACTABLE EPILEPSY WITHOUT STATUS EPILEPTICUS, UNSPECIFIED EPILEPSY TYPE (H): ICD-10-CM

## 2018-10-16 ASSESSMENT — PATIENT HEALTH QUESTIONNAIRE - PHQ9: SUM OF ALL RESPONSES TO PHQ QUESTIONS 1-9: 0

## 2018-10-16 NOTE — PROGRESS NOTES
Service Date: 10/15/2018      Carla Martinez MD   [facility],     [address] [place] [address]      RE: Crow Carnes   MRN: [id number]   : 1995      Dear Dr. Martinez:       Thank you for referring Mr. Crow Carnes to [facility] for transfer of care.  He recently moved to [place] and would like a center closer to his home.  He is a 22-year-old, right-handed male who states that he first started having seizures as an infant.  His mom has told him that he had a traumatic delivery and subsequently had seizures.  The patient denies having intracranial hemorrhage in infancy, but is not aware of the full details and states that he had seizures from infancy to 1 years of age and took a medication and does not know the name of the medication.  The seizures subsided from age 1 up until the age of 16-17, during which time he was medication free.  At the age of 16-17, he began to have recurrent spells, which are described below in detail, but these spells consist of dizziness and an internal sensation of spinning.  No loss of awareness.  These happen 10-20 times per month.  In the past, he has been diagnosed with seizures and put on lamotrigine and topiramate, which he thinks have been helpful.  In the last 4 months, he feels these spells have worsened because of his job hours.  He is working a swing schedule, which means that he works various shifts.  Sometimes he may work from 6:00 a.m. to 2:00 p.m., and other times he may work from 6:00 p.m. to 2:00 a.m.  The patient denies ever having status epilepticus.  No convulsions and no partial seizures with loss of awareness.  Currently, on seizure medications, the patient has insomnia, difficulty with word retrieval and cognitive deficits.      Spell type 1:  The patient has a dizzy sensation.  He feels like he has an internal spinning sensation.  No loss of awareness.  These happen 10-20 times a month, lasting 10-20 seconds.  They  irritate him, but they do not cause trauma.  He may have increased headache after these spells.  When his mom has witnessed them, she says that he is not paying attention.  No history of complex partial seizures, no generalized tonic-clonic seizures, no loss of awareness and no loss of motor control.      RISK FACTORS FOR EPILEPSY:  The patient had seizures as an infant and had a traumatic delivery; he does not know the details.  He took seizure medications from infancy to 1 years of age.  He states that there is no family history of epilepsy.  No history of encephalitis, meningitis or stroke.  He had no major developmental delays in walking or language.  He had an IEP early on, but after middle school, he no longer needed an IEP and did take advanced classes and completed higher education.        PAST MEDICAL HISTORY:  Migraines, depression and epilepsy.      PAST SURGICAL HISTORY:  None.      PAST SEIZURE MEDICATIONS:  Levetiracetam was not helpful, he states, and he is not aware of all his seizure meds.      ALLERGIES:  None.      SOCIAL HISTORY:  The patient lives with a roommate.  He works as a  in the lab.  He does not work with machines, but he does work with chemicals at [employer].  He has completed a bachelor's degree in chemistry.  He is at a new job and is just adjusting to their schedule.  Their hours are variable.  He has swing hours and may work 6:00 a.m. to 2:00 p.m., and other times he may work 6:00 p.m. to 2:00 a.m., so he does forget to take his medications sometimes.  The patient drinks 4 drinks on the weekend.  No smoking, no drugs.      PSYCHOLOGICAL HISTORY:  The patient denies feeling depressed currently.  No suicidal ideations.  He did have an episode of severe depression a year ago, during which time he lost 70 pounds.      EXAMINATION /65  Pulse 90  Temp 97.9  F (36.6  C) (Oral)  Ht 6' (182.9 cm)  Wt 177 lb 3.2 oz (80.4 kg)  SpO2 100%  BMI 24.03  kg/m2  GENERAL:  Alert and oriented x3.  Cognitively, the patient is alert and oriented; however, he is slow to respond.  Has a flat affect.     CARDIOVASCULAR:  Regular rate and rhythm, positive S1, S2.   LUNGS:  Clear to auscultation bilaterally.   ABDOMEN:  Nondistended, nontender.  Normal active bowel sounds.      NEUROLOGICAL EXAMINATION   Mental Status and Higher Cortical Functions:  Alert and oriented to person, place, and time.  Speech fluent, with intact naming and repetition. No dysarthria.  Cranial Nerves (II-XII):  Pupils equal, round, and reactive to light.  Extraocular movements full with no nystagmus.  Visual fields full to confrontation.  Facial sensation intact to light touch, temperature, and pin prick.  Face symmetric at rest and with activation.  Hearing intact to finger rub bilaterally.  Tongue midline and palate elevation symmetric.  Sternocleidomastoid and trapezius 5/5 bilaterally.   Fundoscopic examination was unremarkable for pallor or edema bilaterally.    Motor:  Normal tone, normal bulk, and no pronator drift.  No tremors or fasciculations. Arm/hand circumduction was symmetric.  Motor strength 5/5 in upper and lower extremities.   Sensation:  Intact to light touch, vibration, and temperature.    Coordination:  Normal finger-nose-finger, fine finger movements, and rapid alternating movements.  No ataxia or dysmetria.     Reflexes:  Deep tendon reflexes 2+ and symmetric throughout.    Gait:  Casual gait and stance normal.         IMPRESSION:  A 22-year-old, right-handed male presents with recurrent paroxysmal spells consisting of dizziness and an internal spinning sensation, which in the past have been identified to be seizures on EEG data.  The patient states his MRI of the brain is normal.  We do not have records from [facility], and we will have to obtain them.  He had an EEG at [facility] on 10/15/2018, which was notable for bursts of intermittent generalized delta-theta slowing  consistent with a mild encephalopathy.  Note the patient had one aura in which he felt dizziness, and there were no associated EEG changes.  The EEG was consistent with a mild encephalopathy, and certainly simple partial seizures may be scalp EEG negative.      DISCUSSION:  The patient is currently on topiramate and lamotrigine and has side effects of insomnia and cognitive deficits.  It would be helpful to shift the majority of his lamotrigine into the morning time, 300 mg in the morning and 200 mg at 6:00 a.m., because the patient usually goes to bed at 10:00 p.m.  Additionally, I would like to lower topiramate dose as the patient works as a , and he does have word-retrieval difficulties and cognitive deficits.  We will increase his amitriptyline in an effort to better control headaches.  Consideration may be given to Depakote in the future.  I did ask the patient today if he wanted to do video EEG monitoring for us to characterize his spells better and to make sure he does not have partial seizures with loss of awareness; he declined, as this would not be possible for him.  In the future, we may give consideration to ambulatory EEG.      PLAN:   1.  Reduce topiramate and change administration time of lamotrigine.  Increase amitriptyline.                    Medication Name   Tablet Size         8 AM  (morning)   6 PM (Night)   Notes    Week 1 Lamotrigine 100 mg   3 tablet   2 tablet   Take lamotrigine after food     Topiramate 50 mg   2 tablet    2 tablet       Amitriptyline 10 mg      1 tablet                       Medication Name   Tablet Size         8 AM  (morning)   6 PM (Night)   Notes    Week 2 Lamotrigine 100 mg   3 tablet   2 tablet       Topiramate 50 mg   2 tablet    2 tablet       Amitriptyline 10 mg      2 tablet  Increase dose                     Medication Name   Tablet Size         8 AM  (morning)   6 PM (Night)   Notes    Week 3 Lamotrigine 100 mg   3 tablet   2 tablet       Topiramate 50  mg   1 tablet    2 tablet  Decrease dose     Amitriptyline 10 mg      2 tablet                  Medication Name   Tablet Size         8 AM  (morning)   6 PM (Night)   Notes    Week 4 Lamotrigine 100 mg   3 tablet   2 tablet       Topiramate 50 mg   1 tablet    1 tablet  Decrease dose     Amitriptyline 10 mg      2 tablet         If your seizure gets worse or headaches increase, then go back to the prior week or call [facility]      2.  I typed a letter out for work to reduce his swing hours in an effort to stabilize his schedule and sleep schedule.  This may hopefully reduce his seizures.   3.  Check AED levels for efficacy, toxicity and side effects.   4.  Consider ambulatory EEG in the future to characterize interictal/ictal burden, as the patient declined inpatient hospitalization.     5.  Future medications that may be considered are Trileptal, Vimpat, Zonegran and gabapentin, also.  Follow up in 6 weeks.   6.  Nurse call in 3 weeks to check on AED medication changes.      Thank you for allowing us to participate in Mr. Bolanos' care.      Sincerely,      Carmel Mcgregor MD         I spent 60 minutes with the patient. During this time counseling and coordination of care exceeded 50% of the face to face visit time. I addressed all questions the patient/caregiver raised in regards to the patient's medical care.                D: 10/15/2018   T: 10/16/2018   MT: david      Name:     DANIELLE BOLANOS   MRN:      [id number]        Account:      [id number]   :      1995           Service Date: 10/15/2018      Document: [id number]

## 2018-10-16 NOTE — TELEPHONE ENCOUNTER
Health Call Center    Phone Message    May a detailed message be left on voicemail: no    Reason for Call: Medication Question or concern regarding medication   Prescription Clarification  Name of Medication: topiramate (TOPAMAX) 25 MG tablet  Prescribing Provider: Dr. Mcgregor   Pharmacy: [facility]   What on the order needs clarification? Harshad, Pharmacist is calling to clarify directions/dosing. Amounts and regiment unclear/conflicting.          Action Taken: Message routed to:  [facility] ([facility]): [facility] NEUROLOGY ADULT [facility]

## 2018-10-17 LAB
LAMOTRIGINE SERPL-MCNC: 7.6 UG/ML (ref 2.5–15)
TOPIRAMATE SERPL-MCNC: 3.3 UG/ML (ref 5–20)

## 2018-10-17 RX ORDER — TOPIRAMATE 50 MG/1
TABLET, FILM COATED ORAL
Qty: 120 TABLET | Refills: 0 | Status: SHIPPED | OUTPATIENT
Start: 2018-10-17 | End: 2019-05-13

## 2018-10-17 NOTE — PROCEDURES
Procedure Date: 10/15/2018      EEG #:  .      DATE OF RECORDING:  10/15/2018.      SOURCE FILE DURATION:  2 hours and 53 minutes.      PATIENT INFORMATION:  A 22-year-old male with a history of epilepsy presents for epilepsy evaluation.  EEG is being done to evaluate for seizures.      The patient states he has 1-2 seizures per day.      MEDICATIONS:  Levetiracetam, topiramate, lamotrigine.     TECHNICAL SUMMARY: This video EEG monitoring procedure was performed with 23 scalp electrodes in 10-20 system placements, and additional scalp, precordial and other surface electrodes used for electrical referencing and artifact detection. Video was reviewed intermittently by EEG technologist and physician for electroclinical seizures.      BACKGROUND:  In the fully awake state, the patient has an 8 Hz parietooccipital rhythm.  In the fully awake state, the patient does have bursts of intermittent generalized theta slowing.  He has these bursts of generalized slowing in less than 25% of the record in the awake state.      He does fall asleep later in the file and vertex waves and sleep spindles are appreciated.      ACTIVATION PROCEDURES:  Photic stimulation and hyperventilation is completed with no significant abnormalities.      EPILEPTIFORM DISCHARGES:  No clear epileptiform discharges were seen in this record.      ICTAL:  No electrographic seizures were seen.  The patient had an event at 10:26 during which time he was drowsy and he suddenly aroused, woke up, lifted his head and pushed the event button, and he repositioned himself in bed.  The patient stated, when asked later, he was having a dizzy spell.  He reported that it lasted 10 seconds.  During this time there were no EEG changes.      IMPRESSION:  Awake and sleep video EEG is abnormal due to the presence of intermittent generalized delta-theta slowing consistent with a mild encephalopathy.  The patient had 1 event in which he felt dizziness and there was  no EEG correlate.  Certainly, this may be a simple partial seizure with scalp EEG being negative.  No epileptiform discharges, nor electrographic seizures were seen in the record.  Clinical correlation is advised.         MIKCY SALAZAR MD             D: 10/16/2018   T: 10/17/2018   MT: al      Name:     DANIELLE BOLANOS   MRN:      [id number]        Account:        [id number]   :      1995           Procedure Date: 10/15/2018      Document: [id number]

## 2018-10-17 NOTE — TELEPHONE ENCOUNTER
Nurse received below message.  Chart review indicates order:  topiramate (TOPAMAX) 25 MG tablet      6 ordered         Summary: 100 mg twice a day, Disp-120 tablet, R-6, E-Prescribe  Give 50 mg tablet as we are reducing the dose        Patient after visit summary indicates plan to reduce Topiramate to Two 50 mg tabs / day     Nurse changed order to reflect 50 mg tabs.

## 2018-11-01 ENCOUNTER — VIRTUAL VISIT (OUTPATIENT)
Dept: NEUROLOGY | Facility: CLINIC | Age: 23
End: 2018-11-01
Payer: COMMERCIAL

## 2018-11-01 DIAGNOSIS — G40.919 INTRACTABLE EPILEPSY WITHOUT STATUS EPILEPTICUS, UNSPECIFIED EPILEPSY TYPE (H): Primary | ICD-10-CM

## 2018-11-01 NOTE — PROGRESS NOTES
Patient called to check in on changes made to AEDs. Patient not reached. Closing encounter.

## 2018-11-01 NOTE — MR AVS SNAPSHOT
After Visit Summary   2018    Crow Carnes    MRN: [id number]           Patient Information     Date Of Birth          1995        Visit Information        Provider Department      2018 11:00 AM 1, Me p Nurse [facility]        Today's Diagnoses     Intractable epilepsy without status epilepticus, unspecified epilepsy type (H)    -  1       Follow-ups after your visit        Your next 10 appointments already scheduled     2019  1:00 PM CST   (Arrive by 12:45 PM)   Return Seizure with Carmel Mcgregor MD   [facility] Neurology ([facility])    [address] [place] [id number] [phone]              Who to contact     Please call your clinic at [phone] to:    Ask questions about your health    Make or cancel appointments    Discuss your medicines    Learn about your test results    Speak to your doctor            Additional Information About Your Visit        MyChart Information     [employer] is an electronic gateway that provides easy, online access to your medical records. With [employer], you can request a clinic appointment, read your test results, renew a prescription or communicate with your care team.     To sign up for TwitJumpt visit the website at [email or web address]   You will be asked to enter the access code listed below, as well as some personal information. Please follow the directions to create your username and password.     Your access code is: [email or web address]  Expires: 2019 12:58 PM     Your access code will  in 90 days. If you need help or a new code, please contact your [facility] or call [phone] for assistance.        Care EveryWhere ID     This is your Care EveryWhere ID. This could be used by other organizations to access your [place] medical records  [id number]         Blood Pressure from Last 3 Encounters:   10/15/18 117/65   17 132/84     Weight from Last 3 Encounters:   10/15/18 177 lb 3.2 oz (80.4 kg)   03/11/17 220 lb (99.8 kg)              Today, you had the following     No orders found for display       Primary Care Provider Office Phone # Fax #    Reji Mathews [phone]       [place] [id number] [facility] [id number]        Equal Access to Services     LAURA SEGOVIA : Hadii aad ku hadasho Soomaali, waaxda luqadaha, qaybta kaalmada adeegyada, waxay idiin hayaan adeeg khanibalsh ladavintrell . So [facility] [phone].    ATENCIÓN: Si douglasla flores, tiene a perez disposición servicios gratuitos de asistencia lingüística. Lindaame al [phone].    We comply with applicable federal civil rights laws and [place] laws. We do not discriminate on the basis of race, color, national origin, age, disability, sex, sexual orientation, or gender identity.            Thank you!     Thank you for choosing [facility]  for your care. Our goal is always to provide you with excellent care. Hearing back from our patients is one way we can continue to improve our services. Please take a few minutes to complete the written survey that you may receive in the mail after your visit with us. Thank you!             Your Updated Medication List - Protect others around you: Learn how to safely use, store and throw away your medicines at www.disposemymeds.org.          This list is accurate as of 11/1/18 11:59 PM.  Always use your most recent med list.                   Brand Name Dispense Instructions for use Diagnosis    amitriptyline 10 MG tablet    ELAVIL    60 tablet    Increase as instructed 20 mg at night    Other headache syndrome       fluticasone 50 MCG/ACT nasal spray    FLONASE    1 Bottle    Spray 1-2 sprays into both nostrils daily    Nasal sinus congestion       lamoTRIgine 100 MG tablet    LaMICtal    150 tablet    300 mg am and 200 mg pm    Intractable epilepsy without status epilepticus, unspecified epilepsy type (H)        topiramate 50 MG tablet    TOPAMAX    120 tablet    100 mg twice a day    Intractable epilepsy without status epilepticus, unspecified epilepsy type (H), Other headache syndrome

## 2019-01-28 ENCOUNTER — OFFICE VISIT (OUTPATIENT)
Dept: NEUROLOGY | Facility: CLINIC | Age: 24
End: 2019-01-28
Payer: COMMERCIAL

## 2019-01-28 ENCOUNTER — APPOINTMENT (OUTPATIENT)
Dept: LAB | Facility: CLINIC | Age: 24
End: 2019-01-28
Payer: COMMERCIAL

## 2019-01-28 VITALS
BODY MASS INDEX: 24.38 KG/M2 | WEIGHT: 180 LBS | HEIGHT: 72 IN | DIASTOLIC BLOOD PRESSURE: 74 MMHG | SYSTOLIC BLOOD PRESSURE: 108 MMHG | HEART RATE: 71 BPM | OXYGEN SATURATION: 100 %

## 2019-01-28 DIAGNOSIS — G40.919 INTRACTABLE EPILEPSY WITHOUT STATUS EPILEPTICUS, UNSPECIFIED EPILEPSY TYPE (H): ICD-10-CM

## 2019-01-28 DIAGNOSIS — G44.89 OTHER HEADACHE SYNDROME: ICD-10-CM

## 2019-01-28 RX ORDER — LAMOTRIGINE 100 MG/1
TABLET ORAL
Qty: 165 TABLET | Refills: 11 | Status: SHIPPED | OUTPATIENT
Start: 2019-01-28 | End: 2019-05-13

## 2019-01-28 RX ORDER — AMITRIPTYLINE HYDROCHLORIDE 10 MG/1
TABLET ORAL
Qty: 90 TABLET | Refills: 6 | Status: SHIPPED | OUTPATIENT
Start: 2019-01-28 | End: 2019-05-13

## 2019-01-28 ASSESSMENT — MIFFLIN-ST. JEOR: SCORE: 1839.47

## 2019-01-28 ASSESSMENT — PAIN SCALES - GENERAL: PAINLEVEL: NO PAIN (0)

## 2019-01-28 NOTE — PATIENT INSTRUCTIONS
Week 1  8 AM  (morning)   6 PM (Night)   Notes     Lamotrigine 100 mg   3 tablet   2 tablet       Topiramate 50 mg   1 tablet    1 tablet       Amitriptyline 10 mg      3 tablet  Increase dose        Week 2  8 AM  (morning)   6 PM (Night)   Notes     Lamotrigine 100 mg   3.5 tablet   2 tablet  Increase lamotrigine dose, take lamotrigine after food, may have dizziness or double vision    Topiramate 50 mg   1 tablet    1 tablet       Amitriptyline 10 mg      3 tablet          Week 3  8 AM  (morning)   6 PM (Night)   Notes     Lamotrigine 100 mg   3.5 tablet   2 tablet       Topiramate 50 mg   1 tablet    0 tablet  Decrease topiramate monitor for seizure and headaches    Amitriptyline 10 mg      3 tablet          Week 4  8 AM  (morning)   6 PM (Night)   Notes     Lamotrigine 100 mg   3.5 tablet   2 tablet       Topiramate 50 mg   0 tablet    0 tablet  stop topiramate monitor for seizure and headaches    Amitriptyline 10 mg      3 tablet         Follow up  With Balbir 1-2 months   [facility] nurse call in 3 weeks   If any problems call [facility] [phone]    Carmel Mcgregor MD

## 2019-01-28 NOTE — LETTER
2019       RE: Crow Carnes  [address] [facility] [address]     Dear Colleague,    Thank you for referring your patient, Crow Carnes, to the [facility] NEUROLOGY at [facility]. Please see a copy of my visit note below.    [facility] Progress Note    Patient:  Crow Carnes  :  1995   Age:  23 year old   Today's Office Visit:  2019    Epilepsy Data:  First started having seizures as an infant.  His mom has told him that he had a traumatic delivery and subsequently had seizures.  The patient denies having intracranial hemorrhage in infancy, but is not aware of the full details and states that he had seizures from infancy to 1 years of age and took a medication and does not know the name of the medication.  The seizures subsided from age 1 up until the age of 16-17, during which time he was medication free.  At the age of 16-17, he began to have recurrent spells of dizziness and an internal sensation of spinning.  No loss of awareness.  These happen 10-20 times per month.  In the past, he has been diagnosed with seizures and put on lamotrigine and topiramate, which he thinks have been helpful.  No history of status epilepticus.  No convulsions and no partial seizures with loss of awareness.       Spell type 1:  The patient has a dizzy sensation.  He feels like he has an internal spinning sensation.  No loss of awareness.  These happen 10-20 times a month, lasting 10-20 seconds.  They irritate him, but they do not cause trauma.  He may have increased headache after these spells.  When his mom has witnessed them, she says that he is not paying attention.  No history of complex partial seizures, no generalized tonic-clonic seizures, no loss of awareness and no loss of motor control.       Interval History: On last visit we increased lamotrigine and decreased topiramate. He feels better, thinks more clearly, has no seizures, and sleeping well. After  increasing lamotrigine his last simple partial seizure was 10/2018. Patient did not fall , is compliant with anti seizure medications , did not have emergency room visit  or did not have any hospitalization . Currently, on antiepileptic drugs there is no fatigue, no upset stomach , no dizziness, no double vision , no mood changes (feelings of depression, irritablity, more argumentative) or no insomnia . On this visit we spoke about patient's seizures, antiepileptic drug, and plan of epilepsy are. Patient/caregiver was agreeable with plan of care.        Prior to Admission medications    Medication Sig Start Date End Date Taking? Authorizing Provider   amitriptyline (ELAVIL) 10 MG tablet Increase as instructed 30 mg at night 1/28/19  Yes Carmel Mcgregor MD   lamoTRIgine (LAMICTAL) 100 MG tablet 350 mg am and 200 mg pm 1/28/19  Yes Carmel Mcgregor MD   topiramate (TOPAMAX) 50 MG tablet 100 mg twice a day 10/17/18  Yes Carmel Mcgregor MD   fluticasone (FLONASE) 50 MCG/ACT spray Spray 1-2 sprays into both nostrils daily  Patient not taking: Reported on 1/28/2019 3/11/17   Evie Lackey DO        SOCIAL HISTORY:  The patient lives with a roommate.  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 He like his new job. bachelor's degree in chemistry.  The patient drinks 4 drinks on the weekend.  No smoking, no drugs.      PSYCHOLOGICAL HISTORY:  The patient denies feeling depressed currently.  No suicidal ideations.  He did have an episode of severe depression a year ago, during which time he lost 70 pounds.      EXAMINATION /74 (BP Location: Left arm)   Pulse 71   Ht 1.829 m (6')   Wt 81.6 kg (180 lb)   SpO2 100%   BMI 24.41 kg/m     More alert and interactive compared to 1st visit. Alert, orientated, speech is fluent, face is symmetric, extra-ocular movement in tact, no focal deficits noted.Gait is stable.         IMPRESSION:  A 22-year-old, right-handed male  presents with recurrent paroxysmal spells consisting of dizziness and an internal spinning sensation, which in the past have been identified to be seizures on EEG data.  The patient states his MRI of the brain is normal.  We do not have records from [facility], and we will have to obtain them.  He had an EEG at [facility] on 10/15/2018, which was notable for bursts of intermittent generalized delta-theta slowing consistent with a mild encephalopathy.  Note the patient had one aura in which he felt dizziness, and there were no associated EEG changes.  The EEG was consistent with a mild encephalopathy, and certainly simple partial seizures may be scalp EEG negative.      DISCUSSION:  He tolerated increase in lamotrigine, decreased in topiramate, and increase in amitriptyline. He has no headaches and no seizures. We will wean off topiramate because patient works as a , and he does have word-retrieval difficulties and cognitive deficits on topiramate.  We will increase his amitriptyline to prevent headache recurrence.     If needed, consideration may be given to Depakote in the future.  I did ask the patient today if he wanted to do video EEG monitoring for us to characterize his spells better and to make sure he does not have partial seizures with loss of awareness; he declined, as this would not be possible for him.  In the future, we may give consideration to ambulatory EEG.      PLAN:   1. Wean off topiramate, increase lamotrigine, and Increase amitriptyline.          Week 1  8 AM  (morning)   6 PM (Night)   Notes     Lamotrigine 100 mg   3 tablet   2 tablet       Topiramate 50 mg   1 tablet    1 tablet       Amitriptyline 10 mg      3 tablet  Increase dose        Week 2  8 AM  (morning)   6 PM (Night)   Notes     Lamotrigine 100 mg   3.5 tablet   2 tablet  Increase lamotrigine dose, take lamotrigine after food, may have dizziness or double vision    Topiramate 50 mg   1 tablet    1 tablet        Amitriptyline 10 mg      3 tablet          Week 3  8 AM  (morning)   6 PM (Night)   Notes     Lamotrigine 100 mg   3.5 tablet   2 tablet       Topiramate 50 mg   1 tablet    0 tablet  Decrease topiramate monitor for seizure and headaches    Amitriptyline 10 mg      3 tablet          Week 4  8 AM  (morning)   6 PM (Night)   Notes     Lamotrigine 100 mg   3.5 tablet   2 tablet       Topiramate 50 mg   0 tablet    0 tablet  stop topiramate monitor for seizure and headaches    Amitriptyline 10 mg      3 tablet         2.  Check AED levels for efficacy, toxicity and side effects.   3.  Consider ambulatory EEG in the future to characterize interictal/ictal burden, as the patient declined inpatient hospitalization.     4.  Future medications that may be considered are Trileptal, Vimpat, Zonegran and gabapentin, also.  Follow up in 6 weeks.   5.  Nurse call in 3 weeks to check on AED medication changes.      I spent 40 minutes with the patient. During this time counseling and coordination of care exceeded 50% of the face to face visit time. I addressed all questions the patient/caregiver raised in regards to the patient's medical care.     Again, thank you for allowing me to participate in the care of your patient.      Sincerely,    Carmel Mcgregor MD

## 2019-01-28 NOTE — PROGRESS NOTES
[facility]    Patient:  Crow Carnes  :  1995   Age:  23 year old   Today's Office Visit:  2019    Epilepsy Data:  First started having seizures as an infant.  His mom has told him that he had a traumatic delivery and subsequently had seizures.  The patient denies having intracranial hemorrhage in infancy, but is not aware of the full details and states that he had seizures from infancy to 1 years of age and took a medication and does not know the name of the medication.  The seizures subsided from age 1 up until the age of 16-17, during which time he was medication free.  At the age of 16-17, he began to have recurrent spells of dizziness and an internal sensation of spinning.  No loss of awareness.  These happen 10-20 times per month.  In the past, he has been diagnosed with seizures and put on lamotrigine and topiramate, which he thinks have been helpful.  No history of status epilepticus.  No convulsions and no partial seizures with loss of awareness.       Spell type 1:  The patient has a dizzy sensation.  He feels like he has an internal spinning sensation.  No loss of awareness.  These happen 10-20 times a month, lasting 10-20 seconds.  They irritate him, but they do not cause trauma.  He may have increased headache after these spells.  When his mom has witnessed them, she says that he is not paying attention.  No history of complex partial seizures, no generalized tonic-clonic seizures, no loss of awareness and no loss of motor control.       Interval History: On last visit we increased lamotrigine and decreased topiramate. He feels better, thinks more clearly, has no seizures, and sleeping well. After increasing lamotrigine his last simple partial seizure was 10/2018. Patient did not fall , is compliant with anti seizure medications , did not have emergency room visit  or did not have any hospitalization . Currently, on antiepileptic drugs there is no fatigue, no  upset stomach , no dizziness, no double vision , no mood changes (feelings of depression, irritablity, more argumentative) or no insomnia . On this visit we spoke about patient's seizures, antiepileptic drug, and plan of epilepsy are. Patient/caregiver was agreeable with plan of care.        Prior to Admission medications    Medication Sig Start Date End Date Taking? Authorizing Provider   amitriptyline (ELAVIL) 10 MG tablet Increase as instructed 30 mg at night 1/28/19  Yes Carmel Mcgregor MD   lamoTRIgine (LAMICTAL) 100 MG tablet 350 mg am and 200 mg pm 1/28/19  Yes Carmel Mcgregor MD   topiramate (TOPAMAX) 50 MG tablet 100 mg twice a day 10/17/18  Yes Carmel Mcgregor MD   fluticasone (FLONASE) 50 MCG/ACT spray Spray 1-2 sprays into both nostrils daily  Patient not taking: Reported on 1/28/2019 3/11/17   Evie Lackey DO        SOCIAL HISTORY:  The patient lives with a roommate.  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 He like his new job. bachelor's degree in chemistry.  The patient drinks 4 drinks on the weekend.  No smoking, no drugs.      PSYCHOLOGICAL HISTORY:  The patient denies feeling depressed currently.  No suicidal ideations.  He did have an episode of severe depression a year ago, during which time he lost 70 pounds.      EXAMINATION /74 (BP Location: Left arm)   Pulse 71   Ht 1.829 m (6')   Wt 81.6 kg (180 lb)   SpO2 100%   BMI 24.41 kg/m    More alert and interactive compared to 1st visit. Alert, orientated, speech is fluent, face is symmetric, extra-ocular movement in tact, no focal deficits noted.Gait is stable.         IMPRESSION:  A 22-year-old, right-handed male presents with recurrent paroxysmal spells consisting of dizziness and an internal spinning sensation, which in the past have been identified to be seizures on EEG data.  The patient states his MRI of the brain is normal.  We do not have records from [facility], and we will have to obtain them.  He had an EEG at [facility] on 10/15/2018, which was notable for bursts of intermittent generalized delta-theta slowing consistent with a mild encephalopathy.  Note the patient had one aura in which he felt dizziness, and there were no associated EEG changes.  The EEG was consistent with a mild encephalopathy, and certainly simple partial seizures may be scalp EEG negative.      DISCUSSION:  He tolerated increase in lamotrigine, decreased in topiramate, and increase in amitriptyline. He has no headaches and no seizures. We will wean off topiramate because patient works as a , and he does have word-retrieval difficulties and cognitive deficits on topiramate.  We will increase his amitriptyline to prevent headache recurrence.     If needed, consideration may be given to Depakote in the future.  I did ask the patient today if he wanted to do video EEG monitoring for us to characterize his spells better and to make sure he does not have partial seizures with loss of awareness; he declined, as this would not be possible for him.  In the future, we may give consideration to ambulatory EEG.      PLAN:   1. Wean off topiramate, increase lamotrigine, and Increase amitriptyline.          Week 1  8 AM  (morning)   6 PM (Night)   Notes     Lamotrigine 100 mg   3 tablet   2 tablet       Topiramate 50 mg   1 tablet    1 tablet       Amitriptyline 10 mg      3 tablet  Increase dose        Week 2  8 AM  (morning)   6 PM (Night)   Notes     Lamotrigine 100 mg   3.5 tablet   2 tablet  Increase lamotrigine dose, take lamotrigine after food, may have dizziness or double vision    Topiramate 50 mg   1 tablet    1 tablet       Amitriptyline 10 mg      3 tablet          Week 3  8 AM  (morning)   6 PM (Night)   Notes     Lamotrigine 100 mg   3.5 tablet   2 tablet       Topiramate 50 mg   1 tablet    0 tablet  Decrease topiramate monitor for seizure and headaches    Amitriptyline 10 mg      3  tablet          Week 4  8 AM  (morning)   6 PM (Night)   Notes     Lamotrigine 100 mg   3.5 tablet   2 tablet       Topiramate 50 mg   0 tablet    0 tablet  stop topiramate monitor for seizure and headaches    Amitriptyline 10 mg      3 tablet         2.  Check AED levels for efficacy, toxicity and side effects.   3.  Consider ambulatory EEG in the future to characterize interictal/ictal burden, as the patient declined inpatient hospitalization.     4.  Future medications that may be considered are Trileptal, Vimpat, Zonegran and gabapentin, also.  Follow up in 6 weeks.   5.  Nurse call in 3 weeks to check on AED medication changes.            Carmel Mcgregor MD         I spent 40 minutes with the patient. During this time counseling and coordination of care exceeded 50% of the face to face visit time. I addressed all questions the patient/caregiver raised in regards to the patient's medical care.

## 2019-01-28 NOTE — LETTER
Patient:  Crow Carnes  :   1995  MRN:     [id number]        Mr.Casey Carnes  [address] [facility] [address]        2019    Dear ,    We are writing to inform you of your test results.    Your test results fall within the expected range(s) or remain unchanged from previous results.  Please continue with current treatment plan.    Resulted Orders   Lamotrigine Level   Result Value Ref Range    Lamotrigine Level 5.9 2.5 - 15.0 ug/mL      Comment:      (Note)  INTERPRETIVE INFORMATION:  Lamotrigine  Therapeutic Range:  2.5-15.0 ug/mL             Toxic:  Not well established  Pharmacokinetics varies widely, particularly with   co-medications and/or compromised renal function.  Adverse   effects may include dizziness, somnolence, nausea and   vomiting.  Performed by [email or web address],  [address] [place] [id number] [phone]  [email or web address], [place] MD, Lab. Director         Carmel Mcgregor MD               [id number]  1995

## 2019-01-30 LAB — LAMOTRIGINE SERPL-MCNC: 5.9 UG/ML (ref 2.5–15)

## 2019-02-18 ENCOUNTER — TELEPHONE (OUTPATIENT)
Dept: NEUROLOGY | Facility: CLINIC | Age: 24
End: 2019-02-18

## 2019-02-18 NOTE — TELEPHONE ENCOUNTER
Nurse called patient second time and spoke with him about his titration.  Patient indicates he has completed titration x 1.1/2 weeks.  His only complaint is that he has noticed floater in his vision, but notes that they have been present for some time, and he thinks it is just his eyes, and indicates that he is planning to get an eye check.    He does indicate that the Lamotrigine is giving him insomnia, but understands that it may go away in time as he get accustomed to the Lamotrigine.  Patient specifically denies, double vision, dizziness, or incoordination.    Patient indicates that he has not decreased his Topiramate, and that every time he misses either dose he get a HA.  He has increased Amitriptyline to 3 tabs ( 30 mg ).    Nurse indicated he would speak to Dr. Mcgregor to see if she feels that the floaters might have anything to do with the Lamotrigine.  Encouraged patient to stay at current Lamotrigine dose to see if insomnia improves and if there is any change in vision ( floater ), also encouraged him to continue to decrease his Topiramate dose.

## 2019-02-18 NOTE — TELEPHONE ENCOUNTER
Patient started on Lamotrigine titration;  At MD request nurse is calling to inquire about success of titration, side effects and any other question patient may have.    Placed call to patient left voice mail with call back number and name.  Will attempt call again.

## 2019-02-20 NOTE — TELEPHONE ENCOUNTER
Received message back from Dr. Mcgregor indicating she did not think the floaters were related to the Lamotrigine.  Also indicated she could see patient in regard to HA when he attempts to decrease Topiramate.  Additionally can see providers here  ( at [facility] ) that see HA patient or at the HA clinic.    Placed call to patient with this information, left voice mail with call back number and name.

## 2019-02-21 ENCOUNTER — TELEPHONE (OUTPATIENT)
Dept: OPHTHALMOLOGY | Facility: CLINIC | Age: 24
End: 2019-02-21

## 2019-02-21 NOTE — TELEPHONE ENCOUNTER
Both eyes with floaters times one month  About 5 each eye   Dark lines and translucent in center    No flashing  Vision stable otherwise    Scheduled exam this Saturday at [facility] with dr. Lord    Pt aware of date/time/location  Armando Burgos RN 5:13 PM 02/21/19              [facility]    Phone Message    May a detailed message be left on voicemail: yes    Reason for Call: Other: Pt called stating he is seeing a lot of floaters in vision. He has not been seen here before.     Action Taken: Message routed to:  [facility] ([facility]): eye

## 2019-02-23 ENCOUNTER — OFFICE VISIT (OUTPATIENT)
Dept: OPHTHALMOLOGY | Facility: CLINIC | Age: 24
End: 2019-02-23
Payer: COMMERCIAL

## 2019-02-23 DIAGNOSIS — H52.13 MYOPIA OF BOTH EYES: Primary | ICD-10-CM

## 2019-02-23 ASSESSMENT — CONF VISUAL FIELD
METHOD: COUNTING FINGERS
OD_NORMAL: 1
OS_NORMAL: 1

## 2019-02-23 ASSESSMENT — REFRACTION_WEARINGRX
OS_CYLINDER: +1.75
OD_CYLINDER: SPHERE
OS_AXIS: 180
OD_SPHERE: -14.00
OS_SPHERE: -15.00

## 2019-02-23 ASSESSMENT — VISUAL ACUITY
OD_CC: 20/60
CORRECTION_TYPE: GLASSES
METHOD: SNELLEN - LINEAR
OD_PH_CC: 20/30
OS_CC: 20/30

## 2019-02-23 ASSESSMENT — EXTERNAL EXAM - RIGHT EYE: OD_EXAM: NORMAL

## 2019-02-23 ASSESSMENT — SLIT LAMP EXAM - LIDS
COMMENTS: NORMAL
COMMENTS: NORMAL

## 2019-02-23 ASSESSMENT — TONOMETRY
OD_IOP_MMHG: 14
OS_IOP_MMHG: 12
IOP_METHOD: ICARE

## 2019-02-23 ASSESSMENT — EXTERNAL EXAM - LEFT EYE: OS_EXAM: NORMAL

## 2019-02-23 NOTE — PROGRESS NOTES
CC -   Floaters both eyes     HPI -   February 23, 2019 - First visit: Crow Carnes is a 23 year old year old male presenting for floaters both eyes . History of very high myopia, floaters since about 1.5 month ago, no flashes      PAST OCULAR SURGERY  none    RETINAL IMAGING:   none    ASSESSMENT & PLAN  1- PVD both eyes    - high myope   - no RD/h/t on exam   - RD precautions discussed   - recheck in 6 months, then yearly     2- myopia   - has Rx per outside optom    return to clinic: 6 months retina with dilated fundus exam        Complete documentation of historical and exam elements from today's encounter can be found in the full encounter summary report (not reduplicated in this progress note).  I personally obtained the chief complaint(s) and history of present illness.  I confirmed and edited as necessary the review of systems, past medical/surgical history, family history, social history, and examination findings as documented by others; and I examined the patient myself.  I personally reviewed the relevant tests, images, and reports as documented above.  I formulated and edited as necessary the assessment and plan and discussed the findings and management plan with the patient and family      Kamaljit Lord MD PhD  Vitreoretinal Surgery Fellow  [facility]

## 2019-02-23 NOTE — NURSING NOTE
Patient presents for new consultation for bilateral floaters x's 1 mo. Since the last visit the vision has been stable. Some irritation and tears. On medication to dry eyes, eyes do feel dry. No pain or redness, itching. Persitent floaters, medium, around 5, some transparent and brown, small flashes. H/o migraines with medication. H/o trauma (head hit dodgeball), retina bruising. No eye drops. Verna Alvarez COT 9:51 AM February 23, 2019

## 2019-04-24 ENCOUNTER — OFFICE VISIT (OUTPATIENT)
Dept: OPHTHALMOLOGY | Facility: CLINIC | Age: 24
End: 2019-04-24
Attending: OPHTHALMOLOGY
Payer: COMMERCIAL

## 2019-04-24 DIAGNOSIS — H43.812 POSTERIOR VITREOUS DETACHMENT OF LEFT EYE: Primary | ICD-10-CM

## 2019-04-24 PROCEDURE — G0463 HOSPITAL OUTPT CLINIC VISIT: HCPCS | Mod: ZF

## 2019-04-24 ASSESSMENT — VISUAL ACUITY
OD_PH_CC+: -3
OS_CC: 20/20
OS_CC+: -3
CORRECTION_TYPE: CONTACTS
OD_PH_CC: 20/20
OD_CC: 20/30
METHOD: SNELLEN - LINEAR
OD_CC+: +2

## 2019-04-24 ASSESSMENT — TONOMETRY
OD_IOP_MMHG: 18
IOP_METHOD: TONOPEN
OS_IOP_MMHG: 17

## 2019-04-24 ASSESSMENT — EXTERNAL EXAM - LEFT EYE: OS_EXAM: NORMAL

## 2019-04-24 ASSESSMENT — CONF VISUAL FIELD
OS_NORMAL: 1
METHOD: COUNTING FINGERS
OD_NORMAL: 1

## 2019-04-24 ASSESSMENT — SLIT LAMP EXAM - LIDS
COMMENTS: NORMAL
COMMENTS: NORMAL

## 2019-04-24 ASSESSMENT — EXTERNAL EXAM - RIGHT EYE: OD_EXAM: NORMAL

## 2019-04-24 NOTE — NURSING NOTE
Chief Complaint(s) and History of Present Illness(es)     Floaters     In both eyes.  Associated symptoms include flashes.  Negative for shade, peripheral vision loss and blurred vision.              Comments     Pt c/o increased (small) floaters in BE x 4 weeks and new flashes in LE x 2-3 weeks. Pt states the vision seems about the same. Pt states with CL's, vision is getting a little harder to see midrange. Pt also notes occasional headaches not related to the flashes. Pt denies any eye pain.     Beena Queen, [facility] 7:53 AM April 24, 2019

## 2019-04-24 NOTE — PROGRESS NOTES
CC -   Floaters both eyes     Interval history :  New flashes left eye     HPI -   February 23, 2019 - First visit: Crow Carnes is a 23 year old year old male presenting for floaters both eyes . History of very high myopia, floaters since about 1.5 month ago, no flashes      PAST OCULAR SURGERY  none    RETINAL IMAGING:   none    ASSESSMENT & PLAN  1- PVD both eyes , new flashes left eye    - high myope   - vitreoretinal traction noted supranasal left eye    - no RD/h/t on exam   - RD precautions discussed   - recheck in 6 months, then yearly     2- myopia   - has Rx per outside optom    return to clinic: 3 months retina with dilated fundus exam        Complete documentation of historical and exam elements from today's encounter can be found in the full encounter summary report (not reduplicated in this progress note).  I personally obtained the chief complaint(s) and history of present illness.  I confirmed and edited as necessary the review of systems, past medical/surgical history, family history, social history, and examination findings as documented by others; and I examined the patient myself.  I personally reviewed the relevant tests, images, and reports as documented above.  I formulated and edited as necessary the assessment and plan and discussed the findings and management plan with the patient and family      Kamaljit Lord MD PhD  Vitreoretinal Surgery Fellow  [facility]

## 2019-05-13 ENCOUNTER — APPOINTMENT (OUTPATIENT)
Dept: LAB | Facility: CLINIC | Age: 24
End: 2019-05-13
Payer: COMMERCIAL

## 2019-05-13 ENCOUNTER — OFFICE VISIT (OUTPATIENT)
Dept: NEUROLOGY | Facility: CLINIC | Age: 24
End: 2019-05-13
Payer: COMMERCIAL

## 2019-05-13 VITALS
OXYGEN SATURATION: 99 % | SYSTOLIC BLOOD PRESSURE: 120 MMHG | HEART RATE: 100 BPM | BODY MASS INDEX: 25.05 KG/M2 | DIASTOLIC BLOOD PRESSURE: 69 MMHG | WEIGHT: 189 LBS | HEIGHT: 73 IN

## 2019-05-13 DIAGNOSIS — G40.919 INTRACTABLE EPILEPSY WITHOUT STATUS EPILEPTICUS, UNSPECIFIED EPILEPSY TYPE (H): ICD-10-CM

## 2019-05-13 DIAGNOSIS — G44.89 OTHER HEADACHE SYNDROME: ICD-10-CM

## 2019-05-13 RX ORDER — LAMOTRIGINE 100 MG/1
TABLET ORAL
Qty: 165 TABLET | Refills: 11 | Status: SHIPPED | OUTPATIENT
Start: 2019-05-13

## 2019-05-13 RX ORDER — BUTALBITAL, ACETAMINOPHEN AND CAFFEINE 300; 40; 50 MG/1; MG/1; MG/1
CAPSULE ORAL
Qty: 30 CAPSULE | Refills: 5 | Status: SHIPPED | OUTPATIENT
Start: 2019-05-13 | End: 2020-11-23

## 2019-05-13 RX ORDER — AMITRIPTYLINE HYDROCHLORIDE 10 MG/1
TABLET ORAL
Qty: 120 TABLET | Refills: 3 | Status: SHIPPED | OUTPATIENT
Start: 2019-05-13 | End: 2019-11-19

## 2019-05-13 SDOH — HEALTH STABILITY: MENTAL HEALTH: HOW OFTEN DO YOU HAVE A DRINK CONTAINING ALCOHOL?: 2-4 TIMES A MONTH

## 2019-05-13 SDOH — HEALTH STABILITY: MENTAL HEALTH: HOW MANY STANDARD DRINKS CONTAINING ALCOHOL DO YOU HAVE ON A TYPICAL DAY?: 3 OR 4

## 2019-05-13 ASSESSMENT — MIFFLIN-ST. JEOR: SCORE: 1896.18

## 2019-05-13 ASSESSMENT — PAIN SCALES - GENERAL: PAINLEVEL: NO PAIN (0)

## 2019-05-13 NOTE — PATIENT INSTRUCTIONS
Week 1  8 AM  (morning) take after food   5-6 PM (Night)   Notes     Lamotrigine 100 mg   4 tablet   1.5 tablet              Amitriptyline 10 mg      3 tablet            Week 2  8 AM  (morning)   6 PM (Night)   Notes     Lamotrigine 100 mg   4 tablet   1.5 tablet               Amitriptyline 10 mg      4 tablet  Increase dose, this will help sleep, mood, and headaches       Avoid ipad/iphone use at night, may also consider screen blue light fitler.   Take lamotrigine early in the day  fiocet for headaches     Follow up  4 months at [facility] [phone] [address] [place] [address]    Carmel Mcgregor MD

## 2019-05-13 NOTE — NURSING NOTE
Chief Complaint   Patient presents with     RECHECK     [facility] RETURN - 2 MONTH FOLLOW UP       Lester Holland, EMT

## 2019-05-13 NOTE — PROGRESS NOTES
[facility]    Patient:  Crow Carnes  :  1995   Age:  23 year old   Today's Office Visit:  2019        Epilepsy Data:  First started having seizures as an infant.  His mom has told him that he had a traumatic delivery and subsequently had seizures.  The patient denies having intracranial hemorrhage in infancy, but is not aware of the full details and states that he had seizures from infancy to 1 years of age and took a medication and does not know the name of the medication.  The seizures subsided from age 1 up until the age of 16-17, during which time he was medication free.  At the age of 16-17, he began to have recurrent spells of dizziness and an internal sensation of spinning.  No loss of awareness.  These happen 10-20 times per month.  In the past, he has been diagnosed with seizures and put on lamotrigine and topiramate, which he thinks have been helpful.  No history of status epilepticus.  No convulsions and no partial seizures with loss of awareness.       Spell type 1:  The patient has a dizzy sensation.  He feels like he has an internal spinning sensation.  No loss of awareness.  These happen 10-20 times a month, lasting 10-20 seconds.  They irritate him, but they do not cause trauma.  He may have increased headache after these spells.  When his mom has witnessed them, she says that he is not paying attention.  No history of complex partial seizures, no generalized tonic-clonic seizures, no loss of awareness and no loss of motor control.       Interval History: He currently has seizure was 1-2 per month, dizziness sensation, no loss of awareness. These simple partial seizure do not bother him at this time and he has no loss of awareness. On last visit we increased lamotrigine and reduced topiramate. He feels better, thinks more clearly, has no seizures, and sleeping well. After increasing lamotrigine his last simple partial seizure was 10/2018. Patient did not  "fall , is compliant with anti seizure medications , did not have emergency room visit  or did not have any hospitalization . Currently, on antiepileptic drugs there is no fatigue, no upset stomach , no dizziness, no double vision , no mood changes (feelings of depression, irritablity, more argumentative) or no insomnia . On this visit we spoke about patient's seizures, antiepileptic drug, and plan of epilepsy are. Patient/caregiver was agreeable with plan of care.         Prior to Admission medications    Medication Sig Start Date End Date Taking? Authorizing Provider   amitriptyline (ELAVIL) 10 MG tablet Increase as instructed 30 mg at night 1/28/19  Yes Carmel Mcgregor MD   lamoTRIgine (LAMICTAL) 100 MG tablet 350 mg am and 200 mg pm 1/28/19  Yes Carmel Mcgregor MD   topiramate (TOPAMAX) 50 MG tablet 100 mg twice a day 10/17/18  Yes Carmel Mcgregor MD         SOCIAL HISTORY:  The patient lives with a roommate.  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 He like his new job. bachelor's degree in chemistry.  The patient drinks 4 drinks on the weekend.  No smoking, no drugs.      PSYCHOLOGICAL HISTORY:  The patient denies feeling depressed currently.  No suicidal ideations.  He did have an episode of severe depression a year ago, during which time he lost 70 pounds.      EXAMINATION /69 (BP Location: Left arm, Patient Position: Sitting, Cuff Size: Adult Regular)   Pulse 100   Ht 1.854 m (6' 1\")   Wt 85.7 kg (189 lb)   SpO2 99%   BMI 24.94 kg/m    More alert and interactive compared to 1st visit. Alert, orientated, speech is fluent, face is symmetric, extra-ocular movement in tact, no focal deficits noted.Gait is stable.         IMPRESSION:  A 22-year-old, right-handed male presents with recurrent paroxysmal spells consisting of dizziness and an internal spinning sensation, which in the past have been identified to be seizures on EEG data.  The patient states " his MRI of the brain is normal.  He had an EEG at [facility] on 10/15/2018, which was notable for bursts of intermittent generalized delta-theta slowing consistent with a mild encephalopathy.  Note the patient had one aura in which he felt dizziness, and there were no associated EEG changes.  He continues to have auras couple times a month.  No episodes of loss of awareness.     DISCUSSION: We will increase morning dose of lamotrigine to 400 mg and evening dose to 150 mg reduced.  Patient has insomnia at nighttime.  Additionally he continues to have 2 simple partial seizures per month.  We will consider adding a second agent if needed.  Patient states that at this time he does not want to add on any more seizure medic small seizures do not bother him.  I will increase amitriptyline due to concerns of headaches and insomnia.      If needed, consideration may be given to Depakote in the future.  In the past year I did ask the patient today if he wanted to do video EEG monitoring for us to characterize his spells better and to make sure he does not have partial seizures with loss of awareness; he declined, as this would not be possible for him.  In the future, we may give consideration to ambulatory EEG.      PLAN:      Week 1  8 AM  (morning) take after food   5-6 PM (Night)   Notes     Lamotrigine 100 mg   4 tablet   1.5 tablet              Amitriptyline 10 mg      3 tablet            Week 2  8 AM  (morning)   6 PM (Night)   Notes     Lamotrigine 100 mg   4 tablet   1.5 tablet               Amitriptyline 10 mg      4 tablet  Increase dose, this will help sleep, mood, and headaches       Avoid ipad/iphone use at night, may also consider screen blue light fitler.   Take lamotrigine early in the day  fiocet for headaches   Follow up  4 months at [facility] [phone] [address] [place] [address]  Future medications that may be considered are Trileptal, Vimpat, Zonegran and gabapentin, also.  Follow up in 6  weeks.              Carmel Mcgregor MD         I spent 40 minutes with the patient. During this time counseling and coordination of care exceeded 50% of the face to face visit time. I addressed all questions the patient/caregiver raised in regards to the patient's medical care.

## 2019-05-13 NOTE — LETTER
2019       RE: Crow Carnes  [address] [facility] [address]     Dear Colleague,    Thank you for referring your patient, Crow Carnes, to the [facility] NEUROLOGY at [facility]. Please see a copy of my visit note below.    [facility] Epilepsy Care Progress Note    Patient:  Crow Carnes  :  1995   Age:  23 year old   Today's Office Visit:  2019    Epilepsy Data:  First started having seizures as an infant.  His mom has told him that he had a traumatic delivery and subsequently had seizures.  The patient denies having intracranial hemorrhage in infancy, but is not aware of the full details and states that he had seizures from infancy to 1 years of age and took a medication and does not know the name of the medication.  The seizures subsided from age 1 up until the age of 16-17, during which time he was medication free.  At the age of 16-17, he began to have recurrent spells of dizziness and an internal sensation of spinning.  No loss of awareness.  These happen 10-20 times per month.  In the past, he has been diagnosed with seizures and put on lamotrigine and topiramate, which he thinks have been helpful.  No history of status epilepticus.  No convulsions and no partial seizures with loss of awareness.       Spell type 1:  The patient has a dizzy sensation.  He feels like he has an internal spinning sensation.  No loss of awareness.  These happen 10-20 times a month, lasting 10-20 seconds.  They irritate him, but they do not cause trauma.  He may have increased headache after these spells.  When his mom has witnessed them, she says that he is not paying attention.  No history of complex partial seizures, no generalized tonic-clonic seizures, no loss of awareness and no loss of motor control.     Interval History: He currently has seizure was 1-2 per month, dizziness sensation, no loss of awareness. These simple partial seizure do not bother him at this time  "and he has no loss of awareness. On last visit we increased lamotrigine and reduced topiramate. He feels better, thinks more clearly, has no seizures, and sleeping well. After increasing lamotrigine his last simple partial seizure was 10/2018. Patient did not fall , is compliant with anti seizure medications , did not have emergency room visit  or did not have any hospitalization . Currently, on antiepileptic drugs there is no fatigue, no upset stomach , no dizziness, no double vision , no mood changes (feelings of depression, irritablity, more argumentative) or no insomnia . On this visit we spoke about patient's seizures, antiepileptic drug, and plan of epilepsy are. Patient/caregiver was agreeable with plan of care.     Prior to Admission medications    Medication Sig Start Date End Date Taking? Authorizing Provider   amitriptyline (ELAVIL) 10 MG tablet Increase as instructed 30 mg at night 1/28/19  Yes Carmel Mcgregor MD   lamoTRIgine (LAMICTAL) 100 MG tablet 350 mg am and 200 mg pm 1/28/19  Yes Carmel Mcgregor MD   topiramate (TOPAMAX) 50 MG tablet 100 mg twice a day 10/17/18  Yes Carmel Mcgregor MD         SOCIAL HISTORY:  The patient lives with a roommate.  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 He like his new job. bachelor's degree in chemistry.  The patient drinks 4 drinks on the weekend.  No smoking, no drugs.      PSYCHOLOGICAL HISTORY:  The patient denies feeling depressed currently.  No suicidal ideations.  He did have an episode of severe depression a year ago, during which time he lost 70 pounds.      EXAMINATION /69 (BP Location: Left arm, Patient Position: Sitting, Cuff Size: Adult Regular)   Pulse 100   Ht 1.854 m (6' 1\")   Wt 85.7 kg (189 lb)   SpO2 99%   BMI 24.94 kg/m     More alert and interactive compared to 1st visit. Alert, orientated, speech is fluent, face is symmetric, extra-ocular movement in tact, no focal deficits " noted.Gait is stable.    IMPRESSION:  A 22-year-old, right-handed male presents with recurrent paroxysmal spells consisting of dizziness and an internal spinning sensation, which in the past have been identified to be seizures on EEG data.  The patient states his MRI of the brain is normal.  He had an EEG at [facility] on 10/15/2018, which was notable for bursts of intermittent generalized delta-theta slowing consistent with a mild encephalopathy.  Note the patient had one aura in which he felt dizziness, and there were no associated EEG changes.  He continues to have auras couple times a month.  No episodes of loss of awareness.     DISCUSSION: We will increase morning dose of lamotrigine to 400 mg and evening dose to 150 mg reduced.  Patient has insomnia at nighttime.  Additionally he continues to have 2 simple partial seizures per month.  We will consider adding a second agent if needed.  Patient states that at this time he does not want to add on any more seizure medic small seizures do not bother him.  I will increase amitriptyline due to concerns of headaches and insomnia.      If needed, consideration may be given to Depakote in the future.  In the past year I did ask the patient today if he wanted to do video EEG monitoring for us to characterize his spells better and to make sure he does not have partial seizures with loss of awareness; he declined, as this would not be possible for him.  In the future, we may give consideration to ambulatory EEG.      PLAN:      Week 1  8 AM  (morning) take after food   5-6 PM (Night)   Notes     Lamotrigine 100 mg   4 tablet   1.5 tablet              Amitriptyline 10 mg      3 tablet            Week 2  8 AM  (morning)   6 PM (Night)   Notes     Lamotrigine 100 mg   4 tablet   1.5 tablet               Amitriptyline 10 mg      4 tablet  Increase dose, this will help sleep, mood, and headaches       Avoid ipad/iphone use at night, may also consider screen blue light fitler.    Take lamotrigine early in the day  fiocet for headaches   Follow up  4 months at [facility] [phone] [address] [place] [address]  Future medications that may be considered are Trileptal, Vimpat, Zonegran and gabapentin, also.  Follow up in 6 weeks.     Carmel Mcgregor MD      I spent 40 minutes with the patient. During this time counseling and coordination of care exceeded 50% of the face to face visit time. I addressed all questions the patient/caregiver raised in regards to the patient's medical care.

## 2019-05-13 NOTE — LETTER
Patient:  Crow Carnes  :   1995  MRN:     [id number]        Mr.Casey Carnes  [address] [facility] [address]        2019    Dear ,    We are writing to inform you of your test results.    Your test results fall within the expected range(s) or remain unchanged from previous results.  Please continue with current treatment plan.    Resulted Orders   Lamotrigine Level   Result Value Ref Range    Lamotrigine Level 6.3 2.5 - 15.0 ug/mL      Comment:      (Note)  INTERPRETIVE INFORMATION:  Lamotrigine  Therapeutic Range:  2.5-15.0 ug/mL             Toxic:  Not well established  Pharmacokinetics varies widely, particularly with   co-medications and/or compromised renal function.  Adverse   effects may include dizziness, somnolence, nausea and   vomiting.  Performed by [facility],  [address] [place] [id number] [phone]  [email or web address] [place] MD, Lab. Director         Enjoy Summer, Carmel Mcgregor MD              [phone]  1995

## 2019-05-15 LAB — LAMOTRIGINE SERPL-MCNC: 6.3 UG/ML (ref 2.5–15)

## 2019-11-19 DIAGNOSIS — G44.89 OTHER HEADACHE SYNDROME: ICD-10-CM

## 2019-11-19 RX ORDER — AMITRIPTYLINE HYDROCHLORIDE 10 MG/1
TABLET ORAL
Qty: 120 TABLET | Refills: 0 | Status: SHIPPED | OUTPATIENT
Start: 2019-11-19

## 2019-11-19 NOTE — TELEPHONE ENCOUNTER
Medication request meets [facility] Medication Refill Protocol - Seizure Medications (non-controlled) requirements. Patient is due for a return appointment. This message was placed in the prescription.

## 2020-11-19 DIAGNOSIS — G44.89 OTHER HEADACHE SYNDROME: ICD-10-CM

## 2020-11-19 DIAGNOSIS — G40.919 INTRACTABLE EPILEPSY WITHOUT STATUS EPILEPTICUS, UNSPECIFIED EPILEPSY TYPE (H): ICD-10-CM

## 2020-11-23 RX ORDER — BUTALBITAL, ACETAMINOPHEN AND CAFFEINE 300; 40; 50 MG/1; MG/1; MG/1
CAPSULE ORAL
Qty: 30 CAPSULE | Refills: 5 | Status: SHIPPED | OUTPATIENT
Start: 2020-11-23 | End: 2021-12-21

## 2020-11-23 NOTE — TELEPHONE ENCOUNTER
BUTAL-APAP-CAFF -40 CP   Last Written Prescription Date:  5/13/2019  Last Fill Quantity: 30,   # refills: 5  Last Office Visit : 5/13/2019  Future Office visit:  None    Routing refill request to provider for review/approval because:  Drug not on the [facility], [facility] or [facility] refill protocol or controlled substance      Felipa De Luna RN  Central Triage Red Flags/Med Refills

## 2021-12-21 DIAGNOSIS — G44.89 OTHER HEADACHE SYNDROME: ICD-10-CM

## 2021-12-21 DIAGNOSIS — G40.919 INTRACTABLE EPILEPSY WITHOUT STATUS EPILEPTICUS, UNSPECIFIED EPILEPSY TYPE (H): ICD-10-CM

## 2021-12-21 RX ORDER — BUTALBITAL, ACETAMINOPHEN AND CAFFEINE 300; 40; 50 MG/1; MG/1; MG/1
1-2 CAPSULE ORAL DAILY PRN
Qty: 30 CAPSULE | Refills: 3 | Status: SHIPPED | OUTPATIENT
Start: 2021-12-21

## 2021-12-21 NOTE — TELEPHONE ENCOUNTER
BUTALBITAL-APAP-CAFF -40 CAPS     Last Written Prescription Date:  11/23/2020  Last Fill Quantity: 30,   # refills: 5  Last Office Visit : 5/13/2019  Future Office visit:  None    Routing refill request to provider for review/approval because:  Gap in office visit    May 2019  Not on refill Protocol  Refer to clinic/provider for review       Felipa De Luna RN  Central Triage Red Flags/Med Refills

## 2025-02-06 NOTE — LETTER
10/15/2018          RE: Crow Carnes  : 1995   MRN: [id number]      To Whom It May Concern,       I recommend that Mr. Carnes work regular scheduled hours, to accomodate his medical condition. Swing shifts exacerbate his medical condition and have an adverse effect on his health. Thank you for your cooperation.       Sincerely,       Carmel Mcgregor MD                 Conjuntivae and eyelids appear normal,  Sclerae : White without injection